# Patient Record
Sex: FEMALE | Race: WHITE | NOT HISPANIC OR LATINO | Employment: OTHER | URBAN - METROPOLITAN AREA
[De-identification: names, ages, dates, MRNs, and addresses within clinical notes are randomized per-mention and may not be internally consistent; named-entity substitution may affect disease eponyms.]

---

## 2023-03-06 LAB
ALANINE AMINOTRANSFERASE (SGPT) (U/L) IN SER/PLAS: 11 U/L (ref 7–45)
ALBUMIN (G/DL) IN SER/PLAS: 4.3 G/DL (ref 3.4–5)
ALKALINE PHOSPHATASE (U/L) IN SER/PLAS: 37 U/L (ref 33–136)
ANION GAP IN SER/PLAS: 12 MMOL/L (ref 10–20)
ASPARTATE AMINOTRANSFERASE (SGOT) (U/L) IN SER/PLAS: 21 U/L (ref 9–39)
BILIRUBIN TOTAL (MG/DL) IN SER/PLAS: 0.5 MG/DL (ref 0–1.2)
CALCIUM (MG/DL) IN SER/PLAS: 9.5 MG/DL (ref 8.6–10.6)
CARBON DIOXIDE, TOTAL (MMOL/L) IN SER/PLAS: 31 MMOL/L (ref 21–32)
CHLORIDE (MMOL/L) IN SER/PLAS: 102 MMOL/L (ref 98–107)
CREATININE (MG/DL) IN SER/PLAS: 1.12 MG/DL (ref 0.5–1.05)
ERYTHROCYTE DISTRIBUTION WIDTH (RATIO) BY AUTOMATED COUNT: 14.2 % (ref 11.5–14.5)
ERYTHROCYTE MEAN CORPUSCULAR HEMOGLOBIN CONCENTRATION (G/DL) BY AUTOMATED: 31.5 G/DL (ref 32–36)
ERYTHROCYTE MEAN CORPUSCULAR VOLUME (FL) BY AUTOMATED COUNT: 96 FL (ref 80–100)
ERYTHROCYTES (10*6/UL) IN BLOOD BY AUTOMATED COUNT: 3.69 X10E12/L (ref 4–5.2)
GFR FEMALE: 50 ML/MIN/1.73M2
GLUCOSE (MG/DL) IN SER/PLAS: 94 MG/DL (ref 74–99)
HEMATOCRIT (%) IN BLOOD BY AUTOMATED COUNT: 35.5 % (ref 36–46)
HEMOGLOBIN (G/DL) IN BLOOD: 11.2 G/DL (ref 12–16)
INR IN PPP BY COAGULATION ASSAY: 1.1 (ref 0.9–1.1)
LEUKOCYTES (10*3/UL) IN BLOOD BY AUTOMATED COUNT: 6.5 X10E9/L (ref 4.4–11.3)
NRBC (PER 100 WBCS) BY AUTOMATED COUNT: 0 /100 WBC (ref 0–0)
PLATELETS (10*3/UL) IN BLOOD AUTOMATED COUNT: 209 X10E9/L (ref 150–450)
POTASSIUM (MMOL/L) IN SER/PLAS: 4 MMOL/L (ref 3.5–5.3)
PROTEIN TOTAL: 6.9 G/DL (ref 6.4–8.2)
PROTHROMBIN TIME (PT) IN PPP BY COAGULATION ASSAY: 13.3 SEC (ref 9.8–13.4)
SODIUM (MMOL/L) IN SER/PLAS: 141 MMOL/L (ref 136–145)
UREA NITROGEN (MG/DL) IN SER/PLAS: 23 MG/DL (ref 6–23)

## 2023-07-06 LAB
ALANINE AMINOTRANSFERASE (SGPT) (U/L) IN SER/PLAS: 10 U/L (ref 7–45)
ALBUMIN (G/DL) IN SER/PLAS: 4.2 G/DL (ref 3.4–5)
ALKALINE PHOSPHATASE (U/L) IN SER/PLAS: 35 U/L (ref 33–136)
ANION GAP IN SER/PLAS: 12 MMOL/L (ref 10–20)
ASPARTATE AMINOTRANSFERASE (SGOT) (U/L) IN SER/PLAS: 18 U/L (ref 9–39)
BILIRUBIN TOTAL (MG/DL) IN SER/PLAS: 0.5 MG/DL (ref 0–1.2)
CALCIDIOL (25 OH VITAMIN D3) (NG/ML) IN SER/PLAS: 56 NG/ML
CALCIUM (MG/DL) IN SER/PLAS: 9.5 MG/DL (ref 8.6–10.6)
CARBON DIOXIDE, TOTAL (MMOL/L) IN SER/PLAS: 30 MMOL/L (ref 21–32)
CHLORIDE (MMOL/L) IN SER/PLAS: 106 MMOL/L (ref 98–107)
CHOLESTEROL (MG/DL) IN SER/PLAS: 145 MG/DL (ref 0–199)
CHOLESTEROL IN HDL (MG/DL) IN SER/PLAS: 49.3 MG/DL
CHOLESTEROL/HDL RATIO: 2.9
COBALAMIN (VITAMIN B12) (PG/ML) IN SER/PLAS: 1325 PG/ML (ref 211–911)
CREATININE (MG/DL) IN SER/PLAS: 1.26 MG/DL (ref 0.5–1.05)
ERYTHROCYTE DISTRIBUTION WIDTH (RATIO) BY AUTOMATED COUNT: 13 % (ref 11.5–14.5)
ERYTHROCYTE MEAN CORPUSCULAR HEMOGLOBIN CONCENTRATION (G/DL) BY AUTOMATED: 32.2 G/DL (ref 32–36)
ERYTHROCYTE MEAN CORPUSCULAR VOLUME (FL) BY AUTOMATED COUNT: 101 FL (ref 80–100)
ERYTHROCYTES (10*6/UL) IN BLOOD BY AUTOMATED COUNT: 3.4 X10E12/L (ref 4–5.2)
FERRITIN (UG/LL) IN SER/PLAS: 55 UG/L (ref 8–150)
GFR FEMALE: 43 ML/MIN/1.73M2
GLUCOSE (MG/DL) IN SER/PLAS: 87 MG/DL (ref 74–99)
HEMATOCRIT (%) IN BLOOD BY AUTOMATED COUNT: 34.2 % (ref 36–46)
HEMOGLOBIN (G/DL) IN BLOOD: 11 G/DL (ref 12–16)
IRON (UG/DL) IN SER/PLAS: 89 UG/DL (ref 35–150)
IRON BINDING CAPACITY (UG/DL) IN SER/PLAS: 329 UG/DL (ref 240–445)
IRON SATURATION (%) IN SER/PLAS: 27 % (ref 25–45)
LDL: 77 MG/DL (ref 0–99)
LEUKOCYTES (10*3/UL) IN BLOOD BY AUTOMATED COUNT: 5.7 X10E9/L (ref 4.4–11.3)
NRBC (PER 100 WBCS) BY AUTOMATED COUNT: 0 /100 WBC (ref 0–0)
PLATELETS (10*3/UL) IN BLOOD AUTOMATED COUNT: 192 X10E9/L (ref 150–450)
POTASSIUM (MMOL/L) IN SER/PLAS: 4.2 MMOL/L (ref 3.5–5.3)
PROTEIN TOTAL: 6.9 G/DL (ref 6.4–8.2)
SODIUM (MMOL/L) IN SER/PLAS: 144 MMOL/L (ref 136–145)
THYROTROPIN (MIU/L) IN SER/PLAS BY DETECTION LIMIT <= 0.05 MIU/L: 3.72 MIU/L (ref 0.44–3.98)
TRIGLYCERIDE (MG/DL) IN SER/PLAS: 92 MG/DL (ref 0–149)
UREA NITROGEN (MG/DL) IN SER/PLAS: 14 MG/DL (ref 6–23)
VLDL: 18 MG/DL (ref 0–40)

## 2023-10-23 ENCOUNTER — LAB (OUTPATIENT)
Dept: LAB | Facility: LAB | Age: 83
End: 2023-10-23
Payer: MEDICARE

## 2023-10-23 DIAGNOSIS — I10 ESSENTIAL (PRIMARY) HYPERTENSION: Primary | ICD-10-CM

## 2023-10-23 PROCEDURE — 80048 BASIC METABOLIC PNL TOTAL CA: CPT

## 2023-10-23 PROCEDURE — 36415 COLL VENOUS BLD VENIPUNCTURE: CPT

## 2023-10-24 DIAGNOSIS — E87.6 HYPOKALEMIA: Primary | ICD-10-CM

## 2023-10-24 LAB
ANION GAP SERPL CALC-SCNC: 14 MMOL/L (ref 10–20)
BUN SERPL-MCNC: 26 MG/DL (ref 6–23)
CALCIUM SERPL-MCNC: 9.7 MG/DL (ref 8.6–10.6)
CHLORIDE SERPL-SCNC: 93 MMOL/L (ref 98–107)
CO2 SERPL-SCNC: 35 MMOL/L (ref 21–32)
CREAT SERPL-MCNC: 1.46 MG/DL (ref 0.5–1.05)
GFR SERPL CREATININE-BSD FRML MDRD: 36 ML/MIN/1.73M*2
GLUCOSE SERPL-MCNC: 59 MG/DL (ref 74–99)
POTASSIUM SERPL-SCNC: 3.2 MMOL/L (ref 3.5–5.3)
SODIUM SERPL-SCNC: 139 MMOL/L (ref 136–145)

## 2023-10-24 RX ORDER — POTASSIUM CHLORIDE 20 MEQ/1
TABLET, EXTENDED RELEASE ORAL
Qty: 3 TABLET | Refills: 0 | Status: SHIPPED | OUTPATIENT
Start: 2023-10-24 | End: 2023-11-03 | Stop reason: ALTCHOICE

## 2023-10-27 ENCOUNTER — TELEPHONE (OUTPATIENT)
Dept: PRIMARY CARE | Facility: CLINIC | Age: 83
End: 2023-10-27
Payer: MEDICARE

## 2023-10-27 NOTE — TELEPHONE ENCOUNTER
----- Message from Flory Gage MD sent at 10/24/2023  8:30 AM EDT -----  Potassium is low and some decrease in renal function from the hydrochlorothiazide. Recommend cutting hydrochlorothiazide in half and only taking one half a day. Will send in rx for potassium to take for a few days. Will review at upcoming appointment.

## 2023-10-27 NOTE — TELEPHONE ENCOUNTER
Called pt and spoke with pt and spouse, informed of results and medication direction, voiced understanding.

## 2023-11-02 PROBLEM — M25.472 ANKLE EDEMA, BILATERAL: Status: ACTIVE | Noted: 2022-12-02

## 2023-11-02 PROBLEM — F51.5 NIGHTMARES: Status: ACTIVE | Noted: 2023-11-02

## 2023-11-02 PROBLEM — N32.81 OVERACTIVE BLADDER: Status: ACTIVE | Noted: 2022-09-02

## 2023-11-02 PROBLEM — R53.83 FATIGUE: Status: ACTIVE | Noted: 2023-11-02

## 2023-11-02 PROBLEM — E53.8 VITAMIN B12 DEFICIENCY: Status: ACTIVE | Noted: 2023-11-02

## 2023-11-02 PROBLEM — E87.6 HYPOKALEMIA: Status: ACTIVE | Noted: 2023-11-02

## 2023-11-02 PROBLEM — R79.1 ABNORMAL INR: Status: ACTIVE | Noted: 2023-11-02

## 2023-11-02 PROBLEM — G45.8 OTHER TRANSIENT CEREBRAL ISCHEMIC ATTACKS AND RELATED SYNDROMES: Status: ACTIVE | Noted: 2023-11-02

## 2023-11-02 PROBLEM — F03.90 DEMENTIA (MULTI): Status: ACTIVE | Noted: 2023-11-02

## 2023-11-02 PROBLEM — E55.9 VITAMIN D DEFICIENCY: Status: ACTIVE | Noted: 2023-11-02

## 2023-11-02 PROBLEM — R31.9 HEMATURIA: Status: ACTIVE | Noted: 2023-11-02

## 2023-11-02 PROBLEM — M19.90 ARTHRITIS: Status: ACTIVE | Noted: 2019-04-18

## 2023-11-02 PROBLEM — R93.89 ABNORMAL ULTRASOUND: Status: ACTIVE | Noted: 2023-11-02

## 2023-11-02 PROBLEM — E87.1 HYPONATREMIA: Status: ACTIVE | Noted: 2023-11-02

## 2023-11-02 PROBLEM — I10 ESSENTIAL HYPERTENSION: Status: ACTIVE | Noted: 2019-04-18

## 2023-11-02 PROBLEM — K63.5 BENIGN COLONIC POLYP: Status: ACTIVE | Noted: 2023-11-02

## 2023-11-02 PROBLEM — E78.5 HYPERLIPIDEMIA: Status: ACTIVE | Noted: 2023-11-02

## 2023-11-02 PROBLEM — R93.5 ABNORMAL CT OF THE ABDOMEN: Status: ACTIVE | Noted: 2023-11-02

## 2023-11-02 PROBLEM — H61.21 IMPACTED CERUMEN OF RIGHT EAR: Status: ACTIVE | Noted: 2023-11-02

## 2023-11-02 PROBLEM — H91.90 HEARING LOSS: Status: ACTIVE | Noted: 2023-11-02

## 2023-11-02 PROBLEM — R49.0 HOARSENESS: Status: ACTIVE | Noted: 2023-11-02

## 2023-11-02 PROBLEM — R49.0 RASPY VOICE: Status: ACTIVE | Noted: 2023-11-02

## 2023-11-02 PROBLEM — R23.3 ABNORMAL BRUISING: Status: ACTIVE | Noted: 2023-11-02

## 2023-11-02 PROBLEM — E03.9 HYPOTHYROIDISM: Status: ACTIVE | Noted: 2023-11-02

## 2023-11-02 PROBLEM — R55 SYNCOPE: Status: ACTIVE | Noted: 2023-11-02

## 2023-11-02 PROBLEM — R41.89 IMPAIRED COGNITION: Status: ACTIVE | Noted: 2022-09-03

## 2023-11-02 PROBLEM — R35.1 NOCTURIA: Status: ACTIVE | Noted: 2023-11-02

## 2023-11-02 PROBLEM — R93.89 ABNORMAL MRI: Status: ACTIVE | Noted: 2023-11-02

## 2023-11-02 PROBLEM — S09.90XA CHI (CLOSED HEAD INJURY): Status: ACTIVE | Noted: 2022-12-03

## 2023-11-02 PROBLEM — G47.50 PARASOMNIA: Status: ACTIVE | Noted: 2023-11-02

## 2023-11-02 PROBLEM — F51.3 SLEEP WALKING DISORDER: Status: ACTIVE | Noted: 2023-11-02

## 2023-11-02 PROBLEM — G25.81 RESTLESS LEG SYNDROME: Status: ACTIVE | Noted: 2023-11-02

## 2023-11-02 PROBLEM — I65.29 CAROTID STENOSIS, ASYMPTOMATIC: Status: ACTIVE | Noted: 2023-11-02

## 2023-11-02 PROBLEM — G47.9 DISTURBANCE IN SLEEP BEHAVIOR: Status: ACTIVE | Noted: 2023-11-02

## 2023-11-02 PROBLEM — R60.9 EDEMA: Status: ACTIVE | Noted: 2023-11-02

## 2023-11-02 PROBLEM — R06.83 SNORING: Status: ACTIVE | Noted: 2023-11-02

## 2023-11-02 PROBLEM — E07.9 THYROID CONDITION: Status: ACTIVE | Noted: 2019-04-18

## 2023-11-02 PROBLEM — R35.0 URINARY FREQUENCY: Status: ACTIVE | Noted: 2023-11-02

## 2023-11-02 PROBLEM — R41.3 MEMORY LOSS: Status: ACTIVE | Noted: 2023-11-02

## 2023-11-02 PROBLEM — G47.33 OSA (OBSTRUCTIVE SLEEP APNEA): Status: ACTIVE | Noted: 2022-04-08

## 2023-11-02 PROBLEM — M25.471 ANKLE EDEMA, BILATERAL: Status: ACTIVE | Noted: 2022-12-02

## 2023-11-02 PROBLEM — D72.819 LEUKOPENIA: Status: ACTIVE | Noted: 2023-11-02

## 2023-11-02 PROBLEM — N18.9 CKD (CHRONIC KIDNEY DISEASE): Status: ACTIVE | Noted: 2022-04-08

## 2023-11-02 PROBLEM — D64.9 ANEMIA: Status: ACTIVE | Noted: 2023-11-02

## 2023-11-02 PROBLEM — M81.0 OSTEOPOROSIS: Status: ACTIVE | Noted: 2023-11-02

## 2023-11-02 RX ORDER — CLONAZEPAM 1 MG/1
0.5 TABLET ORAL NIGHTLY
COMMUNITY
Start: 2021-07-17 | End: 2023-11-03 | Stop reason: ALTCHOICE

## 2023-11-02 RX ORDER — HYDROCHLOROTHIAZIDE 25 MG/1
TABLET ORAL
COMMUNITY
Start: 2023-10-23 | End: 2023-11-03 | Stop reason: WASHOUT

## 2023-11-02 RX ORDER — LISINOPRIL AND HYDROCHLOROTHIAZIDE 12.5; 2 MG/1; MG/1
1 TABLET ORAL 2 TIMES DAILY
COMMUNITY
Start: 2019-02-28 | End: 2023-11-03 | Stop reason: WASHOUT

## 2023-11-02 RX ORDER — CALCITRIOL 0.25 UG/1
0.25 CAPSULE ORAL
COMMUNITY
Start: 2023-07-26 | End: 2024-01-22

## 2023-11-02 RX ORDER — AMLODIPINE BESYLATE 5 MG/1
5 TABLET ORAL DAILY
COMMUNITY
Start: 2022-07-19 | End: 2023-11-03 | Stop reason: SDUPTHER

## 2023-11-02 RX ORDER — LISINOPRIL 30 MG/1
1 TABLET ORAL DAILY
COMMUNITY
Start: 2022-12-09 | End: 2023-11-03 | Stop reason: WASHOUT

## 2023-11-02 RX ORDER — ESZOPICLONE 1 MG/1
1 TABLET, FILM COATED ORAL NIGHTLY PRN
COMMUNITY
End: 2024-05-10

## 2023-11-02 RX ORDER — AMMONIUM LACTATE 12 G/100G
LOTION TOPICAL
COMMUNITY
Start: 2022-08-31

## 2023-11-02 RX ORDER — TRIAMCINOLONE ACETONIDE 1 MG/G
CREAM TOPICAL AS NEEDED
COMMUNITY
Start: 2019-04-08

## 2023-11-02 RX ORDER — ATORVASTATIN CALCIUM 20 MG/1
20 TABLET, FILM COATED ORAL DAILY
COMMUNITY
Start: 2019-02-28 | End: 2024-04-12 | Stop reason: SDUPTHER

## 2023-11-02 RX ORDER — FLUOCINONIDE TOPICAL SOLUTION USP, 0.05% 0.5 MG/ML
SOLUTION TOPICAL
COMMUNITY
Start: 2021-11-29

## 2023-11-02 RX ORDER — DONEPEZIL HYDROCHLORIDE 10 MG/1
1 TABLET, FILM COATED ORAL DAILY
COMMUNITY
End: 2023-11-03

## 2023-11-02 RX ORDER — MAGNESIUM 200 MG
1 TABLET ORAL DAILY
COMMUNITY
Start: 2020-08-24

## 2023-11-02 RX ORDER — LISINOPRIL 40 MG/1
40 TABLET ORAL DAILY
COMMUNITY
Start: 2023-09-27 | End: 2023-12-28

## 2023-11-02 RX ORDER — LEVOTHYROXINE SODIUM 25 UG/1
25 TABLET ORAL DAILY
COMMUNITY
Start: 2020-09-11 | End: 2024-02-15 | Stop reason: SDUPTHER

## 2023-11-02 RX ORDER — VIT C/E/ZN/COPPR/LUTEIN/ZEAXAN 250MG-90MG
1000 CAPSULE ORAL DAILY
COMMUNITY
Start: 2023-07-25

## 2023-11-02 RX ORDER — ASPIRIN 81 MG/1
1 TABLET ORAL DAILY
COMMUNITY
Start: 2019-02-28 | End: 2024-01-05 | Stop reason: WASHOUT

## 2023-11-02 RX ORDER — MIRABEGRON 25 MG/1
1 TABLET, FILM COATED, EXTENDED RELEASE ORAL DAILY
COMMUNITY
Start: 2022-12-09 | End: 2024-01-05 | Stop reason: WASHOUT

## 2023-11-02 RX ORDER — AMLODIPINE BESYLATE 2.5 MG/1
2.5 TABLET ORAL DAILY
COMMUNITY
Start: 2022-10-29 | End: 2023-11-03 | Stop reason: WASHOUT

## 2023-11-02 RX ORDER — LATANOPROST 50 UG/ML
1 SOLUTION/ DROPS OPHTHALMIC NIGHTLY
COMMUNITY
Start: 2012-10-10

## 2023-11-02 RX ORDER — DORZOLAMIDE HCL 20 MG/ML
1 SOLUTION/ DROPS OPHTHALMIC 2 TIMES DAILY
COMMUNITY
Start: 2019-04-10

## 2023-11-02 RX ORDER — LISINOPRIL 20 MG/1
20 TABLET ORAL DAILY
COMMUNITY
Start: 2019-10-03 | End: 2023-11-03 | Stop reason: WASHOUT

## 2023-11-02 RX ORDER — ALENDRONATE SODIUM 70 MG/1
TABLET ORAL
COMMUNITY
Start: 2021-12-17 | End: 2024-01-05 | Stop reason: SDUPTHER

## 2023-11-02 RX ORDER — ATENOLOL 25 MG/1
25 TABLET ORAL 2 TIMES DAILY
COMMUNITY
Start: 2012-10-10 | End: 2024-04-17 | Stop reason: SDUPTHER

## 2023-11-03 ENCOUNTER — LAB (OUTPATIENT)
Dept: LAB | Facility: LAB | Age: 83
End: 2023-11-03
Payer: MEDICARE

## 2023-11-03 ENCOUNTER — OFFICE VISIT (OUTPATIENT)
Dept: PRIMARY CARE | Facility: CLINIC | Age: 83
End: 2023-11-03
Payer: MEDICARE

## 2023-11-03 VITALS
BODY MASS INDEX: 18.31 KG/M2 | DIASTOLIC BLOOD PRESSURE: 52 MMHG | TEMPERATURE: 98.2 F | WEIGHT: 97 LBS | HEART RATE: 54 BPM | SYSTOLIC BLOOD PRESSURE: 110 MMHG | HEIGHT: 61 IN | OXYGEN SATURATION: 99 %

## 2023-11-03 DIAGNOSIS — I10 ESSENTIAL HYPERTENSION: ICD-10-CM

## 2023-11-03 DIAGNOSIS — I10 ESSENTIAL HYPERTENSION: Primary | ICD-10-CM

## 2023-11-03 LAB
ALBUMIN SERPL BCP-MCNC: 4.3 G/DL (ref 3.4–5)
ALP SERPL-CCNC: 31 U/L (ref 33–136)
ALT SERPL W P-5'-P-CCNC: 15 U/L (ref 7–45)
ANION GAP SERPL CALC-SCNC: 12 MMOL/L (ref 10–20)
AST SERPL W P-5'-P-CCNC: 23 U/L (ref 9–39)
BILIRUB SERPL-MCNC: 0.5 MG/DL (ref 0–1.2)
BUN SERPL-MCNC: 28 MG/DL (ref 6–23)
CALCIUM SERPL-MCNC: 9.7 MG/DL (ref 8.6–10.3)
CHLORIDE SERPL-SCNC: 99 MMOL/L (ref 98–107)
CO2 SERPL-SCNC: 30 MMOL/L (ref 21–32)
CREAT SERPL-MCNC: 1.71 MG/DL (ref 0.5–1.05)
GFR SERPL CREATININE-BSD FRML MDRD: 30 ML/MIN/1.73M*2
GLUCOSE SERPL-MCNC: 82 MG/DL (ref 74–99)
POTASSIUM SERPL-SCNC: 3.7 MMOL/L (ref 3.5–5.3)
PROT SERPL-MCNC: 7.3 G/DL (ref 6.4–8.2)
SODIUM SERPL-SCNC: 137 MMOL/L (ref 136–145)

## 2023-11-03 PROCEDURE — 3078F DIAST BP <80 MM HG: CPT | Performed by: INTERNAL MEDICINE

## 2023-11-03 PROCEDURE — 99213 OFFICE O/P EST LOW 20 MIN: CPT | Performed by: INTERNAL MEDICINE

## 2023-11-03 PROCEDURE — 80053 COMPREHEN METABOLIC PANEL: CPT

## 2023-11-03 PROCEDURE — 3074F SYST BP LT 130 MM HG: CPT | Performed by: INTERNAL MEDICINE

## 2023-11-03 PROCEDURE — 1036F TOBACCO NON-USER: CPT | Performed by: INTERNAL MEDICINE

## 2023-11-03 PROCEDURE — 1159F MED LIST DOCD IN RCRD: CPT | Performed by: INTERNAL MEDICINE

## 2023-11-03 PROCEDURE — 1126F AMNT PAIN NOTED NONE PRSNT: CPT | Performed by: INTERNAL MEDICINE

## 2023-11-03 PROCEDURE — 36415 COLL VENOUS BLD VENIPUNCTURE: CPT

## 2023-11-03 RX ORDER — AMLODIPINE BESYLATE 5 MG/1
TABLET ORAL
Qty: 135 TABLET | Refills: 1 | Status: SHIPPED | OUTPATIENT
Start: 2023-11-03 | End: 2023-11-03 | Stop reason: DRUGHIGH

## 2023-11-03 RX ORDER — HYDROCHLOROTHIAZIDE 12.5 MG/1
12.5 TABLET ORAL DAILY
Qty: 90 TABLET | Refills: 1 | Status: SHIPPED | OUTPATIENT
Start: 2023-11-03 | End: 2024-01-05 | Stop reason: WASHOUT

## 2023-11-03 ASSESSMENT — PAIN SCALES - GENERAL: PAINLEVEL: 0-NO PAIN

## 2023-11-03 ASSESSMENT — PATIENT HEALTH QUESTIONNAIRE - PHQ9
SUM OF ALL RESPONSES TO PHQ9 QUESTIONS 1 & 2: 0
2. FEELING DOWN, DEPRESSED OR HOPELESS: NOT AT ALL
1. LITTLE INTEREST OR PLEASURE IN DOING THINGS: NOT AT ALL

## 2023-11-03 NOTE — PROGRESS NOTES
"Chief Complaint   Patient presents with    Follow-up     Pt here for 3 week FUV.     F/up htn.  Accompanied by her daughter.  Brought in list of blood pressure readings  Last visit 9/2023  Bp readings 108-172/56-71  Heart rate 60s  Denies any chest pain pressure trouble breathing or other symptoms.  No lower extremity edema.  After last visit lab work showed potassium down to 3.2 and creatinine up to 1.46 so hydrochlorothiazide lowered from 25 mg to 12.5 mg.    Past Medical History  HTN  hyperlipidemia  Hypothyroidism tsh 7/2023 normal  colon polyp scope 2020  osteopenia dexa 11/2021 T -3.1  myalgia secondary to statin  cataract ou sx  B12 deficiency  nightmares Dr Quintanilla, Dr Gore [facility]- ordered sleep study  VANGIE-could not tolerate treatment  CKD Dr Tillman.   abnormal CT abd per GI  pelvis US 12/2020 somewhat complex lower uterine segment appearing cystic abnormality. evaluated by urologist.   cystoscopy was negative. Dr Jewell  Dementia  fall, change in mental status, admission 12/2022 (orthostatic, abnormal ua) Dr Mendoza     , Nonsmoker    Blood pressure 110/52, pulse 54, temperature 36.8 °C (98.2 °F), height 1.549 m (5' 1\"), weight (!) 44 kg (97 lb), SpO2 99 %.  Body mass index is 18.33 kg/m².    Repeat bp right arm patient sitting regular cuff 134/50  NAD  Extremities: no pretibial edema   Latest Reference Range & Units 10/23/23   SODIUM 136 - 145 mmol/L 139   POTASSIUM 3.5 - 5.3 mmol/L 3.2 (L)   CHLORIDE 98 - 107 mmol/L 93 (L)   Bicarbonate 21 - 32 mmol/L 35 (H)   Blood Urea Nitrogen 6 - 23 mg/dL 26 (H)   Creatinine 0.50 - 1.05 mg/dL 1.46 (H)   GLUCOSE 74 - 99 mg/dL 59 (L)   Calcium 8.6 - 10.6 mg/dL 9.7           Asymptomatic.  Obtain left thigh lab work this week or next week.  Continue atenolol, lisinopril, hydrochlorothiazide at low-dose.  Increase amlodipine from 5 mg to 7.5 mg daily.  Declines mammogram  DEXA November 2021  Colonoscopy August 2020    "

## 2023-11-05 DIAGNOSIS — N18.9 CHRONIC KIDNEY DISEASE, UNSPECIFIED CKD STAGE: Primary | ICD-10-CM

## 2023-11-06 ENCOUNTER — TELEPHONE (OUTPATIENT)
Dept: PRIMARY CARE | Facility: CLINIC | Age: 83
End: 2023-11-06
Payer: MEDICARE

## 2023-11-07 NOTE — TELEPHONE ENCOUNTER
----- Message from Flory Gage MD sent at 11/5/2023  5:21 PM EST -----  Potassium is normal but kidney function has declined. Recommend stop hydrochlorothiazide completely and recheck labs in 2-3 weeks. (If uh labs no paperwork needed otherwise send order to patient)   
Called pt and left message to return call to clinical.   
Called pt and left message to return call to clinical.   
Pt and  returned call and informed, voiced understanding.  Pt will be going to  lab.  
General

## 2023-11-13 DIAGNOSIS — I10 HYPERTENSION, UNSPECIFIED TYPE: ICD-10-CM

## 2023-11-14 RX ORDER — AMLODIPINE BESYLATE 2.5 MG/1
2.5 TABLET ORAL DAILY
COMMUNITY
End: 2023-11-28 | Stop reason: SDUPTHER

## 2023-11-14 RX ORDER — AMLODIPINE BESYLATE 2.5 MG/1
2.5 TABLET ORAL DAILY
Qty: 90 TABLET | Refills: 1 | Status: CANCELLED | OUTPATIENT
Start: 2023-11-14

## 2023-11-15 NOTE — TELEPHONE ENCOUNTER
Daughter, Mila, returned call and confirmed that pt is taking Amlodipine 5 MG one and one-half tablets daily.  Daughter states they did not request refill from the pharmacy and do not need refills at this time.

## 2023-11-28 DIAGNOSIS — I10 ESSENTIAL HYPERTENSION: Primary | ICD-10-CM

## 2023-11-28 DIAGNOSIS — I10 HYPERTENSION, ESSENTIAL: ICD-10-CM

## 2023-11-28 RX ORDER — AMLODIPINE BESYLATE 5 MG/1
TABLET ORAL
Qty: 90 TABLET | Refills: 3 | Status: SHIPPED | OUTPATIENT
Start: 2023-11-28 | End: 2024-01-05 | Stop reason: SDUPTHER

## 2023-11-28 RX ORDER — AMLODIPINE BESYLATE 2.5 MG/1
2.5 TABLET ORAL DAILY
Qty: 90 TABLET | Refills: 3 | Status: SHIPPED | OUTPATIENT
Start: 2023-11-28 | End: 2024-04-12 | Stop reason: DRUGHIGH

## 2023-11-30 ENCOUNTER — TELEPHONE (OUTPATIENT)
Dept: PRIMARY CARE | Facility: CLINIC | Age: 83
End: 2023-11-30
Payer: MEDICARE

## 2023-11-30 LAB
NON-UH HIE BUN/CREAT RATIO: 11.3
NON-UH HIE BUN: 17 MG/DL (ref 9–23)
NON-UH HIE CALCIUM: 8.6 MG/DL (ref 8.7–10.4)
NON-UH HIE CALCULATED OSMOLALITY: 277 MOSM/KG (ref 275–295)
NON-UH HIE CHLORIDE: 103 MMOL/L (ref 98–107)
NON-UH HIE CO2, VENOUS: 27 MMOL/L (ref 20–31)
NON-UH HIE CREATININE: 1.5 MG/DL (ref 0.5–0.8)
NON-UH HIE GFR AA: 40
NON-UH HIE GLOMERULAR FILTRATION RATE: 33 ML/MIN/1.73M?
NON-UH HIE GLUCOSE: 86 MG/DL (ref 74–106)
NON-UH HIE K: 3.6 MMOL/L (ref 3.5–5.1)
NON-UH HIE NA: 138 MMOL/L (ref 135–145)

## 2023-11-30 NOTE — TELEPHONE ENCOUNTER
----- Message from Flory Gage MD sent at 11/30/2023  4:38 PM EST -----  Kidney function is abnormal but improved from last test. Recommend continue to hold hydrochlorothiazide.

## 2023-11-30 NOTE — TELEPHONE ENCOUNTER
Called pt and informed pt and .   confirmed that pt is not currently taking the hydrochlorothiazide and will continue to hold.

## 2023-12-28 DIAGNOSIS — I10 HYPERTENSION, UNSPECIFIED TYPE: ICD-10-CM

## 2023-12-28 RX ORDER — LISINOPRIL 40 MG/1
40 TABLET ORAL DAILY
Qty: 90 TABLET | Refills: 0 | Status: SHIPPED | OUTPATIENT
Start: 2023-12-28 | End: 2024-01-22 | Stop reason: SDUPTHER

## 2024-01-04 PROBLEM — R31.9 HEMATURIA: Status: RESOLVED | Noted: 2023-11-02 | Resolved: 2024-01-04

## 2024-01-04 PROBLEM — R49.0 HOARSENESS: Status: RESOLVED | Noted: 2023-11-02 | Resolved: 2024-01-04

## 2024-01-04 PROBLEM — R06.83 SNORING: Status: RESOLVED | Noted: 2023-11-02 | Resolved: 2024-01-04

## 2024-01-04 PROBLEM — R79.1 ABNORMAL INR: Status: RESOLVED | Noted: 2023-11-02 | Resolved: 2024-01-04

## 2024-01-04 PROBLEM — R23.3 ABNORMAL BRUISING: Status: RESOLVED | Noted: 2023-11-02 | Resolved: 2024-01-04

## 2024-01-04 PROBLEM — R41.89 IMPAIRED COGNITION: Status: RESOLVED | Noted: 2022-09-03 | Resolved: 2024-01-04

## 2024-01-04 PROBLEM — H61.21 IMPACTED CERUMEN OF RIGHT EAR: Status: RESOLVED | Noted: 2023-11-02 | Resolved: 2024-01-04

## 2024-01-04 PROBLEM — E07.9 THYROID CONDITION: Status: RESOLVED | Noted: 2019-04-18 | Resolved: 2024-01-04

## 2024-01-04 PROBLEM — R41.3 MEMORY LOSS: Status: RESOLVED | Noted: 2023-11-02 | Resolved: 2024-01-04

## 2024-01-04 PROBLEM — G47.9 DISTURBANCE IN SLEEP BEHAVIOR: Status: RESOLVED | Noted: 2023-11-02 | Resolved: 2024-01-04

## 2024-01-04 PROBLEM — R49.0 RASPY VOICE: Status: RESOLVED | Noted: 2023-11-02 | Resolved: 2024-01-04

## 2024-01-04 NOTE — PROGRESS NOTES
"Standard Progress Note  Chief Complaint   Patient presents with    Follow-up     Pt here for 2 mo FUV.  Pt needs refills.   Daughter present. Needs refills amlodipine 5 mg dose and alendronate.     HPI:  Jennifer June 83 y.o.   female  Chief Complaint   Patient presents with    Follow-up     Pt here for 2 mo FUV.  Pt needs refills.     F/up htn.  Accompanied by her daughter. Last visit 11 /2023  Patient denies chest pain, pressure, palpitations, or shortness of breath.  Patient notes no abdominal pain, nausea, reflux symptoms, and vomiting  Still does the cooking. No problems cooking.     Past Medical History  HTN  hyperlipidemia  Hypothyroidism tsh 7/2023 normal  colon polyp scope 2020  osteopenia dexa 11/2021 T -3.1  myalgia secondary to statin  cataract ou sx  B12 deficiency  nightmares Dr Quintanilla, Dr Gore [facility]- ordered sleep study  VANGIE-could not tolerate treatment  CKD Dr Tillman.   abnormal CT abd per GI  pelvis US 12/2020 somewhat complex lower uterine segment appearing cystic abnormality. evaluated by urologist.   cystoscopy was negative. Dr Jewell  Dementia  fall, change in mental status, admission 12/2022 (orthostatic, abnormal ua) Dr Mendoza     , Nonsmoker    Blood pressure 130/62, pulse 68, temperature 36.4 °C (97.6 °F), height 1.549 m (5' 1\"), weight (!) 44.6 kg (98 lb 6.4 oz), SpO2 98 %.        Vital reviewed  Neck: no cervical/clavicular adenopathy  CV: RRR S1 S2 normal. No murmur. No carotid bruit.   Lungs: CTA without wrr. Breath sounds symmetric  Abdomen: normoactive. Soft, nontender. No mass.  Extremities: no pretibial edema  Neuro: speech intact.   Able to go up and down exam table independently      Lab Results   Component Value Date    GLUCOSE 82 11/03/2023    CALCIUM 9.7 11/03/2023     11/03/2023    K 3.7 11/03/2023    CO2 30 11/03/2023    CL 99 11/03/2023    BUN 28 (H) 11/03/2023    CREATININE 1.71 (H) 11/03/2023      Lab Results   Component Value Date    WBC 5.7 07/06/2023    " "HGB 11.0 (L) 07/06/2023    HCT 34.2 (L) 07/06/2023     (H) 07/06/2023     07/06/2023      Lab Results   Component Value Date    CHOL 145 07/06/2023    CHOL 148 06/09/2022    CHOL 142 06/05/2019     Lab Results   Component Value Date    HDL 49.3 07/06/2023    HDL 54.8 06/09/2022    HDL 48.0 06/05/2019     No results found for: \"LDLCALC\"  Lab Results   Component Value Date    TRIG 92 07/06/2023    TRIG 78 06/09/2022    TRIG 77 06/05/2019     No components found for: \"CHOLHDL\"    Assessment/Plan   Jennifer was seen today for follow-up.  Diagnoses and all orders for this visit:  Osteoporosis, unspecified osteoporosis type, unspecified pathological fracture presence (Primary)  -     alendronate (Fosamax) 70 mg tablet; Take 1 tablet by mouth one time a week. in the AM with glass of water on empty stomach. Do not take anything else by mouth or lie down for 30 min  Essential hypertension  -     amLODIPine (Norvasc) 5 mg tablet; One po at bedtime. Take with 2.5 mg tablet.  Asymptomatic, within goal.   Hypothyroidism  Future tsh  Hyperlipidemia future labs.   F/up April Declines mammogram  DEXA November 2021  Colonoscopy August 2020    Current Outpatient Medications on File Prior to Visit   Medication Sig Dispense Refill    alendronate (Fosamax) 70 mg tablet Take 1 tablet by mouth one time a week. in the AM with glass of water on empty stomach. Do not take anything else by mouth or lie down for 30 min      amLODIPine (Norvasc) 2.5 mg tablet Take 1 tablet (2.5 mg) by mouth once daily. TAKE WITH 5 MG TABLET FOR 7.5 MG TOTAL 90 tablet 3    amLODIPine (Norvasc) 5 mg tablet One po at bedtime. Take with 2.5 mg tablet. 90 tablet 3    ammonium lactate (Lac-Hydrin) 12 % lotion Apply topically daily. apply to affected area      atenolol (Tenormin) 25 mg tablet Take 1 tablet (25 mg) by mouth twice a day.      atorvastatin (Lipitor) 20 mg tablet Take 1 tablet (20 mg) by mouth once daily.      calcitriol (Rocaltrol) " 0.25 mcg capsule Take 1 capsule (0.25 mcg) by mouth. every Monday,Wednesday,Friday.      cholecalciferol (Vitamin D-3) 25 MCG (1000 UT) capsule Take 1 capsule (25 mcg) by mouth once daily.      cranberry 500 mg capsule Take 1 capsule by mouth once daily.      cyanocobalamin, vitamin B-12, 1,000 mcg tablet, sublingual Place 1 tablet (1,000 mcg) under the tongue once daily. As directed.      dorzolamide (Trusopt) 2 % ophthalmic solution Administer 1 drop into both eyes 2 times a day.      eszopiclone (Lunesta) 1 mg tablet Take 1 tablet (1 mg) by mouth as needed at bedtime.      fluocinonide (Lidex) 0.05 % external solution APPLY AS NEEDED FOR ECZEMA      L.acidoph,saliva/B.bif/S.therm (ACIDOPHILUS PROBIOTIC BLEND ORAL) Take 1 capsule by mouth once daily.      latanoprost (Xalatan) 0.005 % ophthalmic solution Administer 1 drop into both eyes once daily at bedtime.      levothyroxine (Synthroid, Levoxyl) 25 mcg tablet Take 1 tablet (25 mcg) by mouth once daily.      lisinopril 40 mg tablet TAKE 1 TABLET DAILY 90 tablet 0    triamcinolone (Kenalog) 0.1 % cream if needed.      Myrbetriq 25 mg tablet extended release 24 hr 24 hr tablet Take 1 tablet (25 mg) by mouth once daily.      [DISCONTINUED] aspirin 81 mg EC tablet Take 1 tablet (81 mg) by mouth once daily.      [DISCONTINUED] hydroCHLOROthiazide (HYDRODiuril) 12.5 mg tablet Take 1 tablet (12.5 mg) by mouth once daily. (Patient not taking: Reported on 1/5/2024) 90 tablet 1     No current facility-administered medications on file prior to visit.         Flory Rivas MD

## 2024-01-05 ENCOUNTER — OFFICE VISIT (OUTPATIENT)
Dept: PRIMARY CARE | Facility: CLINIC | Age: 84
End: 2024-01-05
Payer: MEDICARE

## 2024-01-05 VITALS
DIASTOLIC BLOOD PRESSURE: 62 MMHG | HEIGHT: 61 IN | OXYGEN SATURATION: 98 % | HEART RATE: 68 BPM | BODY MASS INDEX: 18.58 KG/M2 | SYSTOLIC BLOOD PRESSURE: 130 MMHG | TEMPERATURE: 97.6 F | WEIGHT: 98.4 LBS

## 2024-01-05 DIAGNOSIS — I10 ESSENTIAL HYPERTENSION: ICD-10-CM

## 2024-01-05 DIAGNOSIS — E78.5 HYPERLIPIDEMIA, UNSPECIFIED HYPERLIPIDEMIA TYPE: ICD-10-CM

## 2024-01-05 DIAGNOSIS — M81.0 OSTEOPOROSIS, UNSPECIFIED OSTEOPOROSIS TYPE, UNSPECIFIED PATHOLOGICAL FRACTURE PRESENCE: Primary | ICD-10-CM

## 2024-01-05 DIAGNOSIS — E03.9 HYPOTHYROIDISM, UNSPECIFIED TYPE: ICD-10-CM

## 2024-01-05 PROCEDURE — 3078F DIAST BP <80 MM HG: CPT | Performed by: INTERNAL MEDICINE

## 2024-01-05 PROCEDURE — 1159F MED LIST DOCD IN RCRD: CPT | Performed by: INTERNAL MEDICINE

## 2024-01-05 PROCEDURE — 99214 OFFICE O/P EST MOD 30 MIN: CPT | Performed by: INTERNAL MEDICINE

## 2024-01-05 PROCEDURE — 1036F TOBACCO NON-USER: CPT | Performed by: INTERNAL MEDICINE

## 2024-01-05 PROCEDURE — 3075F SYST BP GE 130 - 139MM HG: CPT | Performed by: INTERNAL MEDICINE

## 2024-01-05 PROCEDURE — 1126F AMNT PAIN NOTED NONE PRSNT: CPT | Performed by: INTERNAL MEDICINE

## 2024-01-05 PROCEDURE — 1160F RVW MEDS BY RX/DR IN RCRD: CPT | Performed by: INTERNAL MEDICINE

## 2024-01-05 RX ORDER — AMLODIPINE BESYLATE 5 MG/1
TABLET ORAL
Qty: 90 TABLET | Refills: 3 | Status: SHIPPED | OUTPATIENT
Start: 2024-01-05 | End: 2024-04-12 | Stop reason: DRUGHIGH

## 2024-01-05 RX ORDER — ALENDRONATE SODIUM 70 MG/1
TABLET ORAL
Qty: 12 TABLET | Refills: 3 | Status: SHIPPED | OUTPATIENT
Start: 2024-01-05 | End: 2024-05-10

## 2024-01-05 RX ORDER — POTASSIUM &MAGNESIUM ASPARTATE 250-250 MG
1 CAPSULE ORAL DAILY
COMMUNITY

## 2024-01-05 ASSESSMENT — PATIENT HEALTH QUESTIONNAIRE - PHQ9
1. LITTLE INTEREST OR PLEASURE IN DOING THINGS: NOT AT ALL
2. FEELING DOWN, DEPRESSED OR HOPELESS: NOT AT ALL
SUM OF ALL RESPONSES TO PHQ9 QUESTIONS 1 & 2: 0

## 2024-01-05 ASSESSMENT — PAIN SCALES - GENERAL: PAINLEVEL: 0-NO PAIN

## 2024-01-22 DIAGNOSIS — I10 HYPERTENSION, UNSPECIFIED TYPE: ICD-10-CM

## 2024-01-22 RX ORDER — LISINOPRIL 40 MG/1
40 TABLET ORAL DAILY
Qty: 90 TABLET | Refills: 1 | Status: SHIPPED | OUTPATIENT
Start: 2024-01-22 | End: 2024-04-12 | Stop reason: SDUPTHER

## 2024-02-15 DIAGNOSIS — E03.9 HYPOTHYROIDISM, UNSPECIFIED TYPE: ICD-10-CM

## 2024-02-15 RX ORDER — LEVOTHYROXINE SODIUM 25 UG/1
25 TABLET ORAL DAILY
Qty: 90 TABLET | Refills: 2 | Status: SHIPPED | OUTPATIENT
Start: 2024-02-15 | End: 2024-04-12 | Stop reason: DRUGHIGH

## 2024-04-01 ENCOUNTER — LAB (OUTPATIENT)
Dept: LAB | Facility: LAB | Age: 84
End: 2024-04-01
Payer: MEDICARE

## 2024-04-01 DIAGNOSIS — E78.5 HYPERLIPIDEMIA, UNSPECIFIED HYPERLIPIDEMIA TYPE: ICD-10-CM

## 2024-04-01 DIAGNOSIS — I10 ESSENTIAL HYPERTENSION: ICD-10-CM

## 2024-04-01 DIAGNOSIS — M81.0 OSTEOPOROSIS, UNSPECIFIED OSTEOPOROSIS TYPE, UNSPECIFIED PATHOLOGICAL FRACTURE PRESENCE: ICD-10-CM

## 2024-04-01 DIAGNOSIS — E03.9 HYPOTHYROIDISM, UNSPECIFIED TYPE: ICD-10-CM

## 2024-04-01 LAB
25(OH)D3 SERPL-MCNC: 44 NG/ML (ref 30–100)
ALBUMIN SERPL BCP-MCNC: 4.3 G/DL (ref 3.4–5)
ALP SERPL-CCNC: 32 U/L (ref 33–136)
ALT SERPL W P-5'-P-CCNC: 15 U/L (ref 7–45)
ANION GAP SERPL CALC-SCNC: 13 MMOL/L (ref 10–20)
AST SERPL W P-5'-P-CCNC: 24 U/L (ref 9–39)
BILIRUB SERPL-MCNC: 0.6 MG/DL (ref 0–1.2)
BUN SERPL-MCNC: 21 MG/DL (ref 6–23)
CALCIUM SERPL-MCNC: 9.6 MG/DL (ref 8.6–10.6)
CHLORIDE SERPL-SCNC: 103 MMOL/L (ref 98–107)
CHOLEST SERPL-MCNC: 164 MG/DL (ref 0–199)
CHOLESTEROL/HDL RATIO: 3.2
CO2 SERPL-SCNC: 29 MMOL/L (ref 21–32)
CREAT SERPL-MCNC: 1.65 MG/DL (ref 0.5–1.05)
EGFRCR SERPLBLD CKD-EPI 2021: 31 ML/MIN/1.73M*2
ERYTHROCYTE [DISTWIDTH] IN BLOOD BY AUTOMATED COUNT: 13 % (ref 11.5–14.5)
GLUCOSE SERPL-MCNC: 98 MG/DL (ref 74–99)
HCT VFR BLD AUTO: 35.2 % (ref 36–46)
HDLC SERPL-MCNC: 50.5 MG/DL
HGB BLD-MCNC: 10.9 G/DL (ref 12–16)
LDLC SERPL CALC-MCNC: 96 MG/DL
MCH RBC QN AUTO: 30.4 PG (ref 26–34)
MCHC RBC AUTO-ENTMCNC: 31 G/DL (ref 32–36)
MCV RBC AUTO: 98 FL (ref 80–100)
NON HDL CHOLESTEROL: 114 MG/DL (ref 0–149)
NRBC BLD-RTO: 0 /100 WBCS (ref 0–0)
PLATELET # BLD AUTO: 209 X10*3/UL (ref 150–450)
POTASSIUM SERPL-SCNC: 4 MMOL/L (ref 3.5–5.3)
PROT SERPL-MCNC: 7.2 G/DL (ref 6.4–8.2)
RBC # BLD AUTO: 3.59 X10*6/UL (ref 4–5.2)
SODIUM SERPL-SCNC: 141 MMOL/L (ref 136–145)
TRIGL SERPL-MCNC: 90 MG/DL (ref 0–149)
TSH SERPL-ACNC: 4.59 MIU/L (ref 0.44–3.98)
VLDL: 18 MG/DL (ref 0–40)
WBC # BLD AUTO: 6.1 X10*3/UL (ref 4.4–11.3)

## 2024-04-01 PROCEDURE — 80061 LIPID PANEL: CPT

## 2024-04-01 PROCEDURE — 84443 ASSAY THYROID STIM HORMONE: CPT

## 2024-04-01 PROCEDURE — 82306 VITAMIN D 25 HYDROXY: CPT

## 2024-04-01 PROCEDURE — 80053 COMPREHEN METABOLIC PANEL: CPT

## 2024-04-01 PROCEDURE — 85027 COMPLETE CBC AUTOMATED: CPT

## 2024-04-01 PROCEDURE — 36415 COLL VENOUS BLD VENIPUNCTURE: CPT

## 2024-04-12 ENCOUNTER — OFFICE VISIT (OUTPATIENT)
Dept: PRIMARY CARE | Facility: CLINIC | Age: 84
End: 2024-04-12
Payer: MEDICARE

## 2024-04-12 VITALS
BODY MASS INDEX: 22.58 KG/M2 | DIASTOLIC BLOOD PRESSURE: 70 MMHG | OXYGEN SATURATION: 98 % | SYSTOLIC BLOOD PRESSURE: 150 MMHG | WEIGHT: 100.4 LBS | TEMPERATURE: 97.6 F | HEART RATE: 62 BPM | HEIGHT: 56 IN

## 2024-04-12 DIAGNOSIS — D64.9 ANEMIA, UNSPECIFIED TYPE: ICD-10-CM

## 2024-04-12 DIAGNOSIS — Z00.00 ROUTINE GENERAL MEDICAL EXAMINATION AT HEALTH CARE FACILITY: ICD-10-CM

## 2024-04-12 DIAGNOSIS — M85.80 OTHER SPECIFIED DISORDERS OF BONE DENSITY AND STRUCTURE, UNSPECIFIED SITE: Primary | ICD-10-CM

## 2024-04-12 DIAGNOSIS — I10 HYPERTENSION, UNSPECIFIED TYPE: ICD-10-CM

## 2024-04-12 DIAGNOSIS — E78.5 HYPERLIPIDEMIA, UNSPECIFIED HYPERLIPIDEMIA TYPE: ICD-10-CM

## 2024-04-12 DIAGNOSIS — E03.9 HYPOTHYROIDISM, UNSPECIFIED TYPE: ICD-10-CM

## 2024-04-12 DIAGNOSIS — F03.90 DEMENTIA WITHOUT BEHAVIORAL DISTURBANCE, PSYCHOTIC DISTURBANCE, MOOD DISTURBANCE, OR ANXIETY, UNSPECIFIED DEMENTIA SEVERITY, UNSPECIFIED DEMENTIA TYPE (MULTI): ICD-10-CM

## 2024-04-12 DIAGNOSIS — M85.88 OTHER SPECIFIED DISORDERS OF BONE DENSITY AND STRUCTURE, OTHER SITE: ICD-10-CM

## 2024-04-12 DIAGNOSIS — N18.32 STAGE 3B CHRONIC KIDNEY DISEASE (MULTI): ICD-10-CM

## 2024-04-12 PROCEDURE — 3077F SYST BP >= 140 MM HG: CPT | Performed by: INTERNAL MEDICINE

## 2024-04-12 PROCEDURE — 1170F FXNL STATUS ASSESSED: CPT | Performed by: INTERNAL MEDICINE

## 2024-04-12 PROCEDURE — 1126F AMNT PAIN NOTED NONE PRSNT: CPT | Performed by: INTERNAL MEDICINE

## 2024-04-12 PROCEDURE — 1159F MED LIST DOCD IN RCRD: CPT | Performed by: INTERNAL MEDICINE

## 2024-04-12 PROCEDURE — 3078F DIAST BP <80 MM HG: CPT | Performed by: INTERNAL MEDICINE

## 2024-04-12 PROCEDURE — 90677 PCV20 VACCINE IM: CPT | Performed by: INTERNAL MEDICINE

## 2024-04-12 PROCEDURE — G0009 ADMIN PNEUMOCOCCAL VACCINE: HCPCS | Performed by: INTERNAL MEDICINE

## 2024-04-12 PROCEDURE — G0439 PPPS, SUBSEQ VISIT: HCPCS | Performed by: INTERNAL MEDICINE

## 2024-04-12 PROCEDURE — 1160F RVW MEDS BY RX/DR IN RCRD: CPT | Performed by: INTERNAL MEDICINE

## 2024-04-12 PROCEDURE — 1036F TOBACCO NON-USER: CPT | Performed by: INTERNAL MEDICINE

## 2024-04-12 RX ORDER — LEVOTHYROXINE SODIUM 50 UG/1
50 TABLET ORAL DAILY
Qty: 90 TABLET | Refills: 0 | Status: SHIPPED | OUTPATIENT
Start: 2024-04-12 | End: 2024-06-11 | Stop reason: SDUPTHER

## 2024-04-12 RX ORDER — ATORVASTATIN CALCIUM 20 MG/1
20 TABLET, FILM COATED ORAL DAILY
Qty: 90 TABLET | Refills: 3 | Status: SHIPPED | OUTPATIENT
Start: 2024-04-12

## 2024-04-12 RX ORDER — AMLODIPINE BESYLATE 10 MG/1
10 TABLET ORAL DAILY
Qty: 90 TABLET | Refills: 3 | Status: SHIPPED | OUTPATIENT
Start: 2024-04-12

## 2024-04-12 RX ORDER — LISINOPRIL 40 MG/1
40 TABLET ORAL DAILY
Qty: 90 TABLET | Refills: 1 | Status: SHIPPED | OUTPATIENT
Start: 2024-04-12

## 2024-04-12 ASSESSMENT — PATIENT HEALTH QUESTIONNAIRE - PHQ9
SUM OF ALL RESPONSES TO PHQ9 QUESTIONS 1 & 2: 0
1. LITTLE INTEREST OR PLEASURE IN DOING THINGS: NOT AT ALL
2. FEELING DOWN, DEPRESSED OR HOPELESS: NOT AT ALL

## 2024-04-12 ASSESSMENT — ACTIVITIES OF DAILY LIVING (ADL)
TAKING_MEDICATION: TOTAL CARE
MANAGING_FINANCES: TOTAL CARE
DOING_HOUSEWORK: NEEDS ASSISTANCE
BATHING: INDEPENDENT
GROCERY_SHOPPING: TOTAL CARE
DRESSING: INDEPENDENT

## 2024-04-12 ASSESSMENT — PAIN SCALES - GENERAL: PAINLEVEL: 0-NO PAIN

## 2024-04-12 NOTE — PROGRESS NOTES
"Chief Complaint   Patient presents with    Medicare Annual Wellness Visit Subsequent     Pt here for AWV and 2 mo FUV.         83 y.o. for AWV  Last visit January 2024. Accompanied by daughter.   Denies chest pain, sob, no GI complaints  +urinary incontinence  Compliant with medications.     Past Medical History  HTN  hyperlipidemia  Hypothyroidism tsh  4/2024 abnormal  colon polyp scope 2020  Osteopenia dexa 11/2021 T -3.1  myalgia secondary to statin  cataract ou sx  B12 deficiency  nightmares Dr Quintanilla, Dr Gore [facility]- ordered sleep study  VANGIE-could not tolerate treatment  CKD Dr Tillman.   abnormal CT abd per GI  pelvis US 12/2020 somewhat complex lower uterine segment appearing cystic abnormality. evaluated by urologist.   cystoscopy was negative. Dr Jewell  Dementia  fall, change in mental status, admission 12/2022 (orthostatic, abnormal ua) Dr Mendoza     , Nonsmoker    Blood pressure 140/68, pulse 62, temperature 36.4 °C (97.6 °F), height 1.422 m (4' 8\"), weight 45.5 kg (100 lb 6.4 oz), SpO2 98%.  Body mass index is 22.51 kg/m².    Physical Examination  General: NAD. Alert.   HEENT: Normocephalic atraumatic.    Eyes: no scleral icterus. Extraocular movements intact.  Pupils equal round and reactive to light.  Ears: TM intact.  No cerumen. +hearing loss  Throat: No exudate  Neck:  Supple. No thyroid goiter.  Lymph nodes: No cervical or clavicular adenopathy  Cardiovascular: Regular rate rhythm S1-S2 normal no murmur. No carotid bruit.   Lungs: Clear to Auscultation without wheezing, rales, or rhonchi, Breath sounds symmetric. No use of accessory muscles  Abdomen:  Normoactive bowel sounds, soft, non-tender. No mass.  No organomegaly  Extremities: No pretibial edema  Neuro: no facial asymmetry. Strength upper and lower extremities 5/5. Sensation intact to light touch. No tremor. Babinski negative  Skin: no rash noted  Vascular: DP pulses intact.   Breast: Both are symmetrical no nipple discharge.  No " "skin changes.  No mass palpated.  No axillary adenopathy.    No results found for: \"HGBA1C\"  Lab Results   Component Value Date    GLUCOSE 98 04/01/2024    CALCIUM 9.6 04/01/2024     04/01/2024    K 4.0 04/01/2024    CO2 29 04/01/2024     04/01/2024    BUN 21 04/01/2024    CREATININE 1.65 (H) 04/01/2024     Lab Results   Component Value Date    ALT 15 04/01/2024    AST 24 04/01/2024    ALKPHOS 32 (L) 04/01/2024    BILITOT 0.6 04/01/2024     Lab Results   Component Value Date    WBC 6.1 04/01/2024    HGB 10.9 (L) 04/01/2024    HCT 35.2 (L) 04/01/2024    MCV 98 04/01/2024     04/01/2024     Lab Results   Component Value Date    CHOL 164 04/01/2024    CHOL 145 07/06/2023    CHOL 148 06/09/2022     Lab Results   Component Value Date    HDL 50.5 04/01/2024    HDL 49.3 07/06/2023    HDL 54.8 06/09/2022     Lab Results   Component Value Date    LDLCALC 96 04/01/2024     Lab Results   Component Value Date    TRIG 90 04/01/2024    TRIG 92 07/06/2023    TRIG 78 06/09/2022     No components found for: \"CHOLHDL\"      ASSESSMENT/PLAN  AWV  Living Will: has a living will  Cognition/Memory if 65 or above: recall 3/3  Hepatitis C (18-79) n/a  HIV (18-64, once) n/a  Declines mammogram  DEXA November 2021  Colonoscopy August 2020  Immunization: prevnar today  Depression: denies    If Smoker/Former smoker: screen US aorta (man 65-75) n/a  Age 50-75, 20 pack year history, quit within 15 years or currently smoking  (medicare 55-77, 30 pack year) n/a    I  1. Other specified disorders of bone density and structure, unspecified site    - XR DEXA bone density; Future    2. Hypothyroidism, unspecified type    - Thyroid Stimulating Hormone; Future  - levothyroxine (Synthroid, Levoxyl) 50 mcg tablet; Take 1 tablet (50 mcg) by mouth once daily.  Dispense: 90 tablet; Refill: 0    3. Hypertension, unspecified type  Increase amlodipine from 7.5 mg to 10 mg every day.  - lisinopril 40 mg tablet; Take 1 tablet (40 mg) by mouth " once daily.  Dispense: 90 tablet; Refill: 1  F/up 3 months to evaluate bp  4. Hyperlipidemia, unspecified hyperlipidemia type  - atorvastatin (Lipitor) 20 mg tablet; Take 1 tablet (20 mg) by mouth once daily.  Dispense: 90 tablet; Refill: 3    5. Other specified disorders of bone density and structure, other site    - XR DEXA bone density; Future    6. Anemia, unspecified type  chronic. Stable.   Ckd. Reviewed note from Dr Tillman. F/up July    7. Routine general medical examination at health care facility      8. Stage 3b chronic kidney disease (Multi)  Has fup with nephrologist in July  Discussed CKD can cause anemia    9. Dementia without behavioral disturbance, psychotic disturbance, mood disturbance, or anxiety, unspecified dementia severity, unspecified dementia type (Multi)  Stable. Good family support. Accompanied by daughter today.     Current Outpatient Medications on File Prior to Visit   Medication Sig Dispense Refill    alendronate (Fosamax) 70 mg tablet Take 1 tablet by mouth one time a week. in the AM with glass of water on empty stomach. Do not take anything else by mouth or lie down for 30 min 12 tablet 3    amLODIPine (Norvasc) 2.5 mg tablet Take 1 tablet (2.5 mg) by mouth once daily. TAKE WITH 5 MG TABLET FOR 7.5 MG TOTAL 90 tablet 3    amLODIPine (Norvasc) 5 mg tablet One po at bedtime. Take with 2.5 mg tablet. 90 tablet 3    ammonium lactate (Lac-Hydrin) 12 % lotion Apply topically daily. apply to affected area      atenolol (Tenormin) 25 mg tablet Take 1 tablet (25 mg) by mouth twice a day.      atorvastatin (Lipitor) 20 mg tablet Take 1 tablet (20 mg) by mouth once daily.      cholecalciferol (Vitamin D-3) 25 MCG (1000 UT) capsule Take 1 capsule (25 mcg) by mouth once daily.      cranberry 500 mg capsule Take 1 capsule by mouth once daily.      cyanocobalamin, vitamin B-12, 1,000 mcg tablet, sublingual Place 1 tablet (1,000 mcg) under the tongue once daily. As directed.      DOCOSAHEXAENOIC  ACID-EPA ORAL Take 1 capsule by mouth once daily.      dorzolamide (Trusopt) 2 % ophthalmic solution Administer 1 drop into both eyes 2 times a day.      eszopiclone (Lunesta) 1 mg tablet Take 1 tablet (1 mg) by mouth as needed at bedtime.      fluocinonide (Lidex) 0.05 % external solution APPLY AS NEEDED FOR ECZEMA      L.acidoph,saliva/B.bif/S.therm (ACIDOPHILUS PROBIOTIC BLEND ORAL) Take 1 capsule by mouth once daily.      latanoprost (Xalatan) 0.005 % ophthalmic solution Administer 1 drop into both eyes once daily at bedtime.      levothyroxine (Synthroid, Levoxyl) 25 mcg tablet Take 1 tablet (25 mcg) by mouth once daily. 90 tablet 2    lisinopril 40 mg tablet Take 1 tablet (40 mg) by mouth once daily. 90 tablet 1    triamcinolone (Kenalog) 0.1 % cream if needed.      calcitriol (Rocaltrol) 0.25 mcg capsule Take 1 capsule (0.25 mcg) by mouth. every Monday,Wednesday,Friday.       No current facility-administered medications on file prior to visit.

## 2024-04-17 DIAGNOSIS — I10 HYPERTENSION, UNSPECIFIED TYPE: ICD-10-CM

## 2024-04-17 RX ORDER — ATENOLOL 25 MG/1
25 TABLET ORAL 2 TIMES DAILY
Qty: 180 TABLET | Refills: 1 | Status: SHIPPED | OUTPATIENT
Start: 2024-04-17

## 2024-04-19 ENCOUNTER — HOSPITAL ENCOUNTER (OUTPATIENT)
Dept: RADIOLOGY | Facility: CLINIC | Age: 84
Discharge: HOME | End: 2024-04-19
Payer: MEDICARE

## 2024-04-19 DIAGNOSIS — M85.88 OTHER SPECIFIED DISORDERS OF BONE DENSITY AND STRUCTURE, OTHER SITE: ICD-10-CM

## 2024-04-19 DIAGNOSIS — M85.80 OTHER SPECIFIED DISORDERS OF BONE DENSITY AND STRUCTURE, UNSPECIFIED SITE: ICD-10-CM

## 2024-04-19 PROCEDURE — 77080 DXA BONE DENSITY AXIAL: CPT | Performed by: RADIOLOGY

## 2024-04-19 PROCEDURE — 77080 DXA BONE DENSITY AXIAL: CPT

## 2024-05-07 ENCOUNTER — PATIENT MESSAGE (OUTPATIENT)
Dept: PRIMARY CARE | Facility: CLINIC | Age: 84
End: 2024-05-07
Payer: MEDICARE

## 2024-05-07 DIAGNOSIS — L60.0 INGROWN TOENAIL: Primary | ICD-10-CM

## 2024-05-08 ENCOUNTER — TELEPHONE (OUTPATIENT)
Dept: PRIMARY CARE | Facility: CLINIC | Age: 84
End: 2024-05-08
Payer: MEDICARE

## 2024-06-11 ENCOUNTER — LAB (OUTPATIENT)
Dept: LAB | Facility: LAB | Age: 84
End: 2024-06-11
Payer: MEDICARE

## 2024-06-11 DIAGNOSIS — E03.9 HYPOTHYROIDISM, UNSPECIFIED TYPE: ICD-10-CM

## 2024-06-11 DIAGNOSIS — N18.9 CHRONIC KIDNEY DISEASE, UNSPECIFIED CKD STAGE: ICD-10-CM

## 2024-06-11 LAB
ANION GAP SERPL CALC-SCNC: 15 MMOL/L (ref 10–20)
BUN SERPL-MCNC: 19 MG/DL (ref 6–23)
CALCIUM SERPL-MCNC: 9.8 MG/DL (ref 8.6–10.6)
CHLORIDE SERPL-SCNC: 102 MMOL/L (ref 98–107)
CO2 SERPL-SCNC: 29 MMOL/L (ref 21–32)
CREAT SERPL-MCNC: 1.67 MG/DL (ref 0.5–1.05)
EGFRCR SERPLBLD CKD-EPI 2021: 31 ML/MIN/1.73M*2
GLUCOSE SERPL-MCNC: 95 MG/DL (ref 74–99)
POTASSIUM SERPL-SCNC: 3.6 MMOL/L (ref 3.5–5.3)
SODIUM SERPL-SCNC: 142 MMOL/L (ref 136–145)
TSH SERPL-ACNC: 1.21 MIU/L (ref 0.44–3.98)

## 2024-06-11 PROCEDURE — 84443 ASSAY THYROID STIM HORMONE: CPT

## 2024-06-11 PROCEDURE — 36415 COLL VENOUS BLD VENIPUNCTURE: CPT

## 2024-06-11 PROCEDURE — 80048 BASIC METABOLIC PNL TOTAL CA: CPT

## 2024-06-11 RX ORDER — LEVOTHYROXINE SODIUM 50 UG/1
50 TABLET ORAL DAILY
Qty: 90 TABLET | Refills: 3 | Status: SHIPPED | OUTPATIENT
Start: 2024-06-11 | End: 2025-06-11

## 2024-07-12 ENCOUNTER — APPOINTMENT (OUTPATIENT)
Dept: PRIMARY CARE | Facility: CLINIC | Age: 84
End: 2024-07-12
Payer: MEDICARE

## 2024-07-17 PROBLEM — H61.20 IMPACTED CERUMEN: Status: ACTIVE | Noted: 2023-11-02

## 2024-07-17 PROBLEM — M85.80 OSTEOPENIA: Status: ACTIVE | Noted: 2024-07-17

## 2024-07-17 PROBLEM — I10 HYPERTENSION: Status: ACTIVE | Noted: 2019-01-07

## 2024-07-17 PROBLEM — G45.9 TRANSIENT ISCHEMIC ATTACK: Status: ACTIVE | Noted: 2023-11-02

## 2024-07-17 PROBLEM — I65.29 STENOSIS OF CAROTID ARTERY: Status: ACTIVE | Noted: 2018-09-05

## 2024-07-17 RX ORDER — ESZOPICLONE 1 MG/1
1 TABLET, FILM COATED ORAL NIGHTLY PRN
COMMUNITY
Start: 2023-05-18

## 2024-07-17 RX ORDER — ALENDRONATE SODIUM 70 MG/1
TABLET ORAL
COMMUNITY
Start: 2024-07-03

## 2024-07-19 ENCOUNTER — APPOINTMENT (OUTPATIENT)
Dept: PRIMARY CARE | Facility: CLINIC | Age: 84
End: 2024-07-19
Payer: MEDICARE

## 2024-07-19 VITALS
HEIGHT: 56 IN | WEIGHT: 96 LBS | DIASTOLIC BLOOD PRESSURE: 54 MMHG | BODY MASS INDEX: 21.59 KG/M2 | HEART RATE: 66 BPM | SYSTOLIC BLOOD PRESSURE: 110 MMHG

## 2024-07-19 DIAGNOSIS — N18.32 STAGE 3B CHRONIC KIDNEY DISEASE (MULTI): ICD-10-CM

## 2024-07-19 DIAGNOSIS — L30.9 ECZEMA, UNSPECIFIED TYPE: Primary | ICD-10-CM

## 2024-07-19 DIAGNOSIS — I65.8 OCCLUSION AND STENOSIS OF OTHER PRECEREBRAL ARTERIES: ICD-10-CM

## 2024-07-19 DIAGNOSIS — I10 HYPERTENSION, UNSPECIFIED TYPE: ICD-10-CM

## 2024-07-19 DIAGNOSIS — M81.0 OSTEOPOROSIS, UNSPECIFIED OSTEOPOROSIS TYPE, UNSPECIFIED PATHOLOGICAL FRACTURE PRESENCE: ICD-10-CM

## 2024-07-19 DIAGNOSIS — E03.9 HYPOTHYROIDISM, UNSPECIFIED TYPE: ICD-10-CM

## 2024-07-19 PROBLEM — H61.20 IMPACTED CERUMEN: Status: RESOLVED | Noted: 2023-11-02 | Resolved: 2024-07-19

## 2024-07-19 PROCEDURE — 3078F DIAST BP <80 MM HG: CPT | Performed by: INTERNAL MEDICINE

## 2024-07-19 PROCEDURE — 3074F SYST BP LT 130 MM HG: CPT | Performed by: INTERNAL MEDICINE

## 2024-07-19 PROCEDURE — 1158F ADVNC CARE PLAN TLK DOCD: CPT | Performed by: INTERNAL MEDICINE

## 2024-07-19 PROCEDURE — 1036F TOBACCO NON-USER: CPT | Performed by: INTERNAL MEDICINE

## 2024-07-19 PROCEDURE — 1123F ACP DISCUSS/DSCN MKR DOCD: CPT | Performed by: INTERNAL MEDICINE

## 2024-07-19 PROCEDURE — 99214 OFFICE O/P EST MOD 30 MIN: CPT | Performed by: INTERNAL MEDICINE

## 2024-07-19 PROCEDURE — 1159F MED LIST DOCD IN RCRD: CPT | Performed by: INTERNAL MEDICINE

## 2024-07-19 RX ORDER — FLUOCINONIDE TOPICAL SOLUTION USP, 0.05% 0.5 MG/ML
SOLUTION TOPICAL 2 TIMES DAILY PRN
Qty: 60 ML | Refills: 11 | Status: SHIPPED | OUTPATIENT
Start: 2024-07-19

## 2024-07-19 ASSESSMENT — PATIENT HEALTH QUESTIONNAIRE - PHQ9
SUM OF ALL RESPONSES TO PHQ9 QUESTIONS 1 AND 2: 0
1. LITTLE INTEREST OR PLEASURE IN DOING THINGS: NOT AT ALL
2. FEELING DOWN, DEPRESSED OR HOPELESS: NOT AT ALL

## 2024-07-19 NOTE — PROGRESS NOTES
"Chief Complaint   Patient presents with    Follow-up     bp       83 y.o. woman  Last visit 4/2024    Accompanied by daughter.   At that visit reported urinary incontinence  Patient denies chest pain, pressure, palpitations, or shortness of breath.  Patient notes no abdominal pain, nausea, and vomiting  Needs refill on lidex solution uses it for eczema.   Some ankle swelling. Variable. Not painful. No problem putting shoes on.     Past Medical History  HTN  hyperlipidemia  Hypothyroidism tsh  6/2024 normal  colon polyp scope 2020  Osteopenia dexa 11/2021 T -3.1, dexa 4/2024 T -2.9  myalgia secondary to statin  cataract ou sx  B12 deficiency  nightmares Dr Quintanilla, Dr Gore [facility]- ordered sleep study  VANGIE-could not tolerate treatment  CKD Dr Tillman.   abnormal CT abd per GI  pelvis US 12/2020 somewhat complex lower uterine segment appearing cystic abnormality. evaluated by urologist.   cystoscopy was negative. Dr Jewell  Dementia  fall, change in mental status, admission 12/2022 (orthostatic, abnormal ua) Dr Mendoza     , Nonsmoker    Blood pressure 110/54, pulse 66, height 1.422 m (4' 8\"), weight (!) 43.5 kg (96 lb).  Body mass index is 21.52 kg/m².    Vital reviewed  Neck: no cervical/clavicular adenopathy  CV: RRR S1 S2 normal. No murmur. +right carotid bruit. No left carotid bruit  Lungs: CTA without wrr. Breath sounds symmetric  Abdomen: normoactive. Soft, nontender. No mass.  Neuro: speech intact.     6/2024 TSH, BMP  4/2024 tsh, D, cbc, cmp, lipid    No results found for: \"HGBA1C\"  Lab Results   Component Value Date    GLUCOSE 95 06/11/2024    CALCIUM 9.8 06/11/2024     06/11/2024    K 3.6 06/11/2024    CO2 29 06/11/2024     06/11/2024    BUN 19 06/11/2024    CREATININE 1.67 (H) 06/11/2024     Lab Results   Component Value Date    ALT 15 04/01/2024    AST 24 04/01/2024    ALKPHOS 32 (L) 04/01/2024    BILITOT 0.6 04/01/2024     Lab Results   Component Value Date    WBC 6.1 04/01/2024    HGB " "10.9 (L) 04/01/2024    HCT 35.2 (L) 04/01/2024    MCV 98 04/01/2024     04/01/2024     Lab Results   Component Value Date    CHOL 164 04/01/2024    CHOL 145 07/06/2023    CHOL 148 06/09/2022     Lab Results   Component Value Date    HDL 50.5 04/01/2024    HDL 49.3 07/06/2023    HDL 54.8 06/09/2022     Lab Results   Component Value Date    LDLCALC 96 04/01/2024     Lab Results   Component Value Date    TRIG 90 04/01/2024    TRIG 92 07/06/2023    TRIG 78 06/09/2022     No components found for: \"CHOLHDL\"      ASSESSMENT/PLAN    1. Eczema, unspecified type  - fluocinonide (Lidex) 0.05 % external solution; Apply topically 2 times a day as needed for rash.  Dispense: 60 mL; Refill: 11    2. 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 Hypothyroidism, unspecified type  Recent tsh within goal    4. Hypertension, unspecified type  Well controlled brought list of home readings that are all within goal.  Patient asymptomatic  - Comprehensive Metabolic Panel; Future  - CBC; Future  - Lipid Panel; Future    5. Stage 3b chronic kidney disease (Multi)  - Vitamin D 25-Hydroxy,Total (for eval of Vitamin D levels); Future    6. Osteoporosis, unspecified osteoporosis type, unspecified pathological fracture presence  - Vitamin D 25-Hydroxy,Total (for eval of Vitamin D levels); Future    Living Will: has a living will  Declines mammogram  DEXA 4/2024 T -2.9  Colonoscopy August 2020    Current Outpatient Medications on File Prior to Visit   Medication Sig Dispense Refill    alendronate (Fosamax) 70 mg tablet TAKE ONE TABLET ONCE WEEKLY IN THE MORNING WITH GLASS OF WATER ON AN EMPTY STOMACH. DO NOT TAKE ANYTHING ELSE OR LIE DOWN FOR 30 MINUTES      amLODIPine (Norvasc) 10 mg tablet Take 1 tablet (10 mg) by mouth once daily. 90 tablet 3    ammonium lactate (Lac-Hydrin) 12 % lotion Apply topically daily. apply to affected area      atenolol (Tenormin) 25 mg tablet " Take 1 tablet (25 mg) by mouth 2 times a day. 180 tablet 1    atorvastatin (Lipitor) 20 mg tablet Take 1 tablet (20 mg) by mouth once daily. 90 tablet 3    cholecalciferol (Vitamin D-3) 25 MCG (1000 UT) capsule Take 1 capsule (25 mcg) by mouth once daily.      cranberry 500 mg capsule Take 1 capsule by mouth once daily.      cyanocobalamin, vitamin B-12, 1,000 mcg tablet, sublingual Place 1 tablet (1,000 mcg) under the tongue once daily. As directed.      dorzolamide (Trusopt) 2 % ophthalmic solution Administer 1 drop into both eyes 2 times a day.      fluocinonide (Lidex) 0.05 % external solution APPLY AS NEEDED FOR ECZEMA      L.acidoph,saliva/B.bif/S.therm (ACIDOPHILUS PROBIOTIC BLEND ORAL) Take 1 capsule by mouth once daily.      latanoprost (Xalatan) 0.005 % ophthalmic solution Administer 1 drop into both eyes once daily at bedtime.      levothyroxine (Synthroid, Levoxyl) 50 mcg tablet Take 1 tablet (50 mcg) by mouth once daily. 90 tablet 3    lisinopril 40 mg tablet Take 1 tablet (40 mg) by mouth once daily. 90 tablet 1    triamcinolone (Kenalog) 0.1 % cream if needed.      calcitriol (Rocaltrol) 0.25 mcg capsule Take 1 capsule (0.25 mcg) by mouth. every Monday,Wednesday,Friday.      eszopiclone (Lunesta) 1 mg tablet Take 1 tablet (1 mg) by mouth as needed at bedtime.       No current facility-administered medications on file prior to visit.

## 2024-07-29 DIAGNOSIS — E03.9 HYPOTHYROIDISM, UNSPECIFIED TYPE: ICD-10-CM

## 2024-07-29 RX ORDER — LEVOTHYROXINE SODIUM 50 UG/1
50 TABLET ORAL DAILY
Qty: 90 TABLET | Refills: 3 | Status: SHIPPED | OUTPATIENT
Start: 2024-07-29 | End: 2025-07-29

## 2024-08-19 ENCOUNTER — APPOINTMENT (OUTPATIENT)
Dept: CARDIOLOGY | Facility: CLINIC | Age: 84
End: 2024-08-19
Payer: MEDICARE

## 2024-08-23 ENCOUNTER — APPOINTMENT (OUTPATIENT)
Dept: PRIMARY CARE | Facility: CLINIC | Age: 84
End: 2024-08-23
Payer: MEDICARE

## 2024-08-23 VITALS
WEIGHT: 96.8 LBS | HEIGHT: 56 IN | SYSTOLIC BLOOD PRESSURE: 120 MMHG | TEMPERATURE: 98.2 F | HEART RATE: 64 BPM | BODY MASS INDEX: 21.78 KG/M2 | DIASTOLIC BLOOD PRESSURE: 52 MMHG | OXYGEN SATURATION: 98 %

## 2024-08-23 DIAGNOSIS — I10 HYPERTENSION, UNSPECIFIED TYPE: Primary | ICD-10-CM

## 2024-08-23 DIAGNOSIS — R60.9 EDEMA, UNSPECIFIED TYPE: ICD-10-CM

## 2024-08-23 PROCEDURE — 1159F MED LIST DOCD IN RCRD: CPT | Performed by: INTERNAL MEDICINE

## 2024-08-23 PROCEDURE — 1036F TOBACCO NON-USER: CPT | Performed by: INTERNAL MEDICINE

## 2024-08-23 PROCEDURE — 1123F ACP DISCUSS/DSCN MKR DOCD: CPT | Performed by: INTERNAL MEDICINE

## 2024-08-23 PROCEDURE — 3078F DIAST BP <80 MM HG: CPT | Performed by: INTERNAL MEDICINE

## 2024-08-23 PROCEDURE — 1126F AMNT PAIN NOTED NONE PRSNT: CPT | Performed by: INTERNAL MEDICINE

## 2024-08-23 PROCEDURE — 3074F SYST BP LT 130 MM HG: CPT | Performed by: INTERNAL MEDICINE

## 2024-08-23 PROCEDURE — 99214 OFFICE O/P EST MOD 30 MIN: CPT | Performed by: INTERNAL MEDICINE

## 2024-08-23 PROCEDURE — 1158F ADVNC CARE PLAN TLK DOCD: CPT | Performed by: INTERNAL MEDICINE

## 2024-08-23 RX ORDER — FUROSEMIDE 20 MG/1
TABLET ORAL
Qty: 2 TABLET | Refills: 0 | Status: SHIPPED | OUTPATIENT
Start: 2024-08-23

## 2024-08-23 RX ORDER — AMLODIPINE BESYLATE 5 MG/1
TABLET ORAL
Qty: 90 TABLET | Refills: 3 | Status: SHIPPED | OUTPATIENT
Start: 2024-08-23

## 2024-08-23 ASSESSMENT — PAIN SCALES - GENERAL: PAINLEVEL: 0-NO PAIN

## 2024-08-23 ASSESSMENT — PATIENT HEALTH QUESTIONNAIRE - PHQ9
1. LITTLE INTEREST OR PLEASURE IN DOING THINGS: NOT AT ALL
SUM OF ALL RESPONSES TO PHQ9 QUESTIONS 1 & 2: 0
2. FEELING DOWN, DEPRESSED OR HOPELESS: NOT AT ALL

## 2024-08-23 NOTE — PROGRESS NOTES
"Chief Complaint   Patient presents with    Leg Swelling     Pt here for swelling to bilateral ankles and lower legs, onset 2-3 weeks.       83 y.o. woman  Last visit  7/2024    Accompanied by daughter.    At that time-Some ankle swelling. Variable. Not painful. No problem putting shoes on.     Since last visit saw Dr Tillman.   No swelling at that time  Last 3 weeks having LE edema. Denies chest pain, pressure, palpitations, sob, or PND.  Eating chips sometimes  Not using compression stockings    Past Medical History  HTN  hyperlipidemia  Hypothyroidism tsh  6/2024 normal  colon polyp scope 2020  Osteopenia dexa 11/2021 T -3.1, dexa 4/2024 T -2.9  myalgia secondary to statin  cataract ou sx  B12 deficiency  nightmares Dr Quintanilla, Dr Gore [facility]- ordered sleep study  VANGIE-could not tolerate treatment  CKD Dr Tillman.   abnormal CT abd per GI  pelvis US 12/2020 somewhat complex lower uterine segment appearing cystic abnormality. evaluated by urologist.   cystoscopy was negative. Dr Jewell  Dementia  fall, change in mental status, admission 12/2022 (orthostatic, abnormal ua) Dr Mendoza     , Nonsmoker    Blood pressure 120/52, pulse 64, temperature 36.8 °C (98.2 °F), height 1.422 m (4' 8\"), weight (!) 43.9 kg (96 lb 12.8 oz), SpO2 98%.  Body mass index is 21.7 kg/m².  Weight stable.     Vital reviewed  Neck: no cervical/clavicular adenopathy  CV: RRR S1 S2 normal. No murmur.  No JVD  Lungs: CTA without wrr. Breath sounds symmetric  Abdomen: normoactive. Soft, nontender. No mass.  Extremities: +2 pitting pretibial edema  Neuro: speech intact.     6/2024 TSH, BMP  4/2024 tsh, D, cbc, cmp, lipid    No results found for: \"HGBA1C\"  Lab Results   Component Value Date    GLUCOSE 95 06/11/2024    CALCIUM 9.8 06/11/2024     06/11/2024    K 3.6 06/11/2024    CO2 29 06/11/2024     06/11/2024    BUN 19 06/11/2024    CREATININE 1.67 (H) 06/11/2024     Lab Results   Component Value Date    ALT 15 04/01/2024    AST 24 " "04/01/2024    ALKPHOS 32 (L) 04/01/2024    BILITOT 0.6 04/01/2024     Lab Results   Component Value Date    WBC 6.1 04/01/2024    HGB 10.9 (L) 04/01/2024    HCT 35.2 (L) 04/01/2024    MCV 98 04/01/2024     04/01/2024     Lab Results   Component Value Date    CHOL 164 04/01/2024    CHOL 145 07/06/2023    CHOL 148 06/09/2022     Lab Results   Component Value Date    HDL 50.5 04/01/2024    HDL 49.3 07/06/2023    HDL 54.8 06/09/2022     Lab Results   Component Value Date    LDLCALC 96 04/01/2024     Lab Results   Component Value Date    TRIG 90 04/01/2024    TRIG 92 07/06/2023    TRIG 78 06/09/2022     No components found for: \"CHOLHDL\"      ASSESSMENT/PLAN  1. Hypertension, unspecified type  Decrease amlodipine from 10 to 5 mg every day.continue to keep a list.   Reviewed readings from home and within goal.   - amLODIPine (Norvasc) 5 mg tablet; One po qhs  Dispense: 90 tablet; Refill: 3    2. Edema, unspecified type  Leg elevation.  Compression stockings  Lasix for 2 days then stop.  Decrease amlodipine dose-understands will need to closely watch bp. Has close follow up.   - furosemide (Lasix) 20 mg tablet; 1 po every day for 2 days then stop  Dispense: 2 tablet; Refill: 0    Living Will: has a living will  Declines mammogram  DEXA 4/2024 T -2.9  Colonoscopy August 2020    Current Outpatient Medications on File Prior to Visit   Medication Sig Dispense Refill    amLODIPine (Norvasc) 10 mg tablet Take 1 tablet (10 mg) by mouth once daily. 90 tablet 3    ammonium lactate (Lac-Hydrin) 12 % lotion Apply topically daily. apply to affected area      atenolol (Tenormin) 25 mg tablet Take 1 tablet (25 mg) by mouth 2 times a day. 180 tablet 1    atorvastatin (Lipitor) 20 mg tablet Take 1 tablet (20 mg) by mouth once daily. 90 tablet 3    cholecalciferol (Vitamin D-3) 25 MCG (1000 UT) capsule Take 1 capsule (25 mcg) by mouth once daily.      cranberry 500 mg capsule Take 1 capsule by mouth once daily.      cyanocobalamin, " vitamin B-12, 1,000 mcg tablet, sublingual Place 1 tablet (1,000 mcg) under the tongue once daily. As directed.      dorzolamide (Trusopt) 2 % ophthalmic solution Administer 1 drop into both eyes 2 times a day.      fluocinonide (Lidex) 0.05 % external solution Apply topically 2 times a day as needed for rash. 60 mL 11    L.acidoph,saliva/B.bif/S.therm (ACIDOPHILUS PROBIOTIC BLEND ORAL) Take 1 capsule by mouth once daily.      latanoprost (Xalatan) 0.005 % ophthalmic solution Administer 1 drop into both eyes once daily at bedtime.      levothyroxine (Synthroid, Levoxyl) 50 mcg tablet Take 1 tablet (50 mcg) by mouth once daily. 90 tablet 3    lisinopril 40 mg tablet Take 1 tablet (40 mg) by mouth once daily. 90 tablet 1    triamcinolone (Kenalog) 0.1 % cream if needed.      calcitriol (Rocaltrol) 0.25 mcg capsule Take 1 capsule (0.25 mcg) by mouth. every Monday,Wednesday,Friday.      [DISCONTINUED] alendronate (Fosamax) 70 mg tablet TAKE ONE TABLET ONCE WEEKLY IN THE MORNING WITH GLASS OF WATER ON AN EMPTY STOMACH. DO NOT TAKE ANYTHING ELSE OR LIE DOWN FOR 30 MINUTES      [DISCONTINUED] eszopiclone (Lunesta) 1 mg tablet Take 1 tablet (1 mg) by mouth as needed at bedtime.       No current facility-administered medications on file prior to visit.

## 2024-10-02 ENCOUNTER — HOSPITAL ENCOUNTER (OUTPATIENT)
Dept: CARDIOLOGY | Facility: CLINIC | Age: 84
Discharge: HOME | End: 2024-10-02
Payer: MEDICARE

## 2024-10-02 ENCOUNTER — LAB (OUTPATIENT)
Dept: LAB | Facility: LAB | Age: 84
End: 2024-10-02
Payer: MEDICARE

## 2024-10-02 DIAGNOSIS — N18.32 STAGE 3B CHRONIC KIDNEY DISEASE (MULTI): ICD-10-CM

## 2024-10-02 DIAGNOSIS — I65.8 OCCLUSION AND STENOSIS OF OTHER PRECEREBRAL ARTERIES: ICD-10-CM

## 2024-10-02 DIAGNOSIS — I10 HYPERTENSION, UNSPECIFIED TYPE: ICD-10-CM

## 2024-10-02 DIAGNOSIS — I65.9 OCCLUSION AND STENOSIS OF PRECEREBRAL ARTERY: ICD-10-CM

## 2024-10-02 DIAGNOSIS — M81.0 OSTEOPOROSIS, UNSPECIFIED OSTEOPOROSIS TYPE, UNSPECIFIED PATHOLOGICAL FRACTURE PRESENCE: ICD-10-CM

## 2024-10-02 LAB
25(OH)D3 SERPL-MCNC: 68 NG/ML (ref 30–100)
ALBUMIN SERPL BCP-MCNC: 4.3 G/DL (ref 3.4–5)
ALP SERPL-CCNC: 39 U/L (ref 33–136)
ALT SERPL W P-5'-P-CCNC: 8 U/L (ref 7–45)
ANION GAP SERPL CALC-SCNC: 12 MMOL/L (ref 10–20)
AST SERPL W P-5'-P-CCNC: 17 U/L (ref 9–39)
BILIRUB SERPL-MCNC: 0.8 MG/DL (ref 0–1.2)
BUN SERPL-MCNC: 29 MG/DL (ref 6–23)
CALCIUM SERPL-MCNC: 9.2 MG/DL (ref 8.6–10.3)
CHLORIDE SERPL-SCNC: 101 MMOL/L (ref 98–107)
CHOLEST SERPL-MCNC: 164 MG/DL (ref 0–199)
CHOLESTEROL/HDL RATIO: 3.7
CO2 SERPL-SCNC: 29 MMOL/L (ref 21–32)
CREAT SERPL-MCNC: 2.03 MG/DL (ref 0.5–1.05)
EGFRCR SERPLBLD CKD-EPI 2021: 24 ML/MIN/1.73M*2
ERYTHROCYTE [DISTWIDTH] IN BLOOD BY AUTOMATED COUNT: 12.5 % (ref 11.5–14.5)
GLUCOSE SERPL-MCNC: 79 MG/DL (ref 74–99)
HCT VFR BLD AUTO: 31.2 % (ref 36–46)
HDLC SERPL-MCNC: 44.9 MG/DL
HGB BLD-MCNC: 10.5 G/DL (ref 12–16)
LDLC SERPL CALC-MCNC: 96 MG/DL
MCH RBC QN AUTO: 32.4 PG (ref 26–34)
MCHC RBC AUTO-ENTMCNC: 33.7 G/DL (ref 32–36)
MCV RBC AUTO: 96 FL (ref 80–100)
NON HDL CHOLESTEROL: 119 MG/DL (ref 0–149)
NRBC BLD-RTO: 0 /100 WBCS (ref 0–0)
PLATELET # BLD AUTO: 244 X10*3/UL (ref 150–450)
POTASSIUM SERPL-SCNC: 3.9 MMOL/L (ref 3.5–5.3)
PROT SERPL-MCNC: 7.1 G/DL (ref 6.4–8.2)
RBC # BLD AUTO: 3.24 X10*6/UL (ref 4–5.2)
SODIUM SERPL-SCNC: 138 MMOL/L (ref 136–145)
TRIGL SERPL-MCNC: 114 MG/DL (ref 0–149)
VLDL: 23 MG/DL (ref 0–40)
WBC # BLD AUTO: 6.3 X10*3/UL (ref 4.4–11.3)

## 2024-10-02 PROCEDURE — 93880 EXTRACRANIAL BILAT STUDY: CPT

## 2024-10-02 PROCEDURE — 93880 EXTRACRANIAL BILAT STUDY: CPT | Performed by: INTERNAL MEDICINE

## 2024-10-02 PROCEDURE — 36415 COLL VENOUS BLD VENIPUNCTURE: CPT

## 2024-10-02 PROCEDURE — 80061 LIPID PANEL: CPT

## 2024-10-02 PROCEDURE — 80053 COMPREHEN METABOLIC PANEL: CPT

## 2024-10-02 PROCEDURE — 82306 VITAMIN D 25 HYDROXY: CPT

## 2024-10-02 PROCEDURE — 85027 COMPLETE CBC AUTOMATED: CPT

## 2024-10-11 ENCOUNTER — APPOINTMENT (OUTPATIENT)
Dept: PRIMARY CARE | Facility: CLINIC | Age: 84
End: 2024-10-11
Payer: MEDICARE

## 2024-10-18 ENCOUNTER — APPOINTMENT (OUTPATIENT)
Dept: PRIMARY CARE | Facility: CLINIC | Age: 84
End: 2024-10-18
Payer: MEDICARE

## 2024-10-18 VITALS
TEMPERATURE: 98.2 F | SYSTOLIC BLOOD PRESSURE: 148 MMHG | OXYGEN SATURATION: 96 % | BODY MASS INDEX: 22.13 KG/M2 | DIASTOLIC BLOOD PRESSURE: 60 MMHG | HEIGHT: 56 IN | HEART RATE: 63 BPM | WEIGHT: 98.4 LBS

## 2024-10-18 DIAGNOSIS — I10 HYPERTENSION, UNSPECIFIED TYPE: Primary | ICD-10-CM

## 2024-10-18 DIAGNOSIS — E78.5 HYPERLIPIDEMIA, UNSPECIFIED HYPERLIPIDEMIA TYPE: ICD-10-CM

## 2024-10-18 DIAGNOSIS — N18.32 STAGE 3B CHRONIC KIDNEY DISEASE (MULTI): ICD-10-CM

## 2024-10-18 DIAGNOSIS — E03.9 HYPOTHYROIDISM, UNSPECIFIED TYPE: ICD-10-CM

## 2024-10-18 PROCEDURE — 1160F RVW MEDS BY RX/DR IN RCRD: CPT | Performed by: INTERNAL MEDICINE

## 2024-10-18 PROCEDURE — 1158F ADVNC CARE PLAN TLK DOCD: CPT | Performed by: INTERNAL MEDICINE

## 2024-10-18 PROCEDURE — 1126F AMNT PAIN NOTED NONE PRSNT: CPT | Performed by: INTERNAL MEDICINE

## 2024-10-18 PROCEDURE — 99214 OFFICE O/P EST MOD 30 MIN: CPT | Performed by: INTERNAL MEDICINE

## 2024-10-18 PROCEDURE — 3077F SYST BP >= 140 MM HG: CPT | Performed by: INTERNAL MEDICINE

## 2024-10-18 PROCEDURE — 3078F DIAST BP <80 MM HG: CPT | Performed by: INTERNAL MEDICINE

## 2024-10-18 PROCEDURE — 1159F MED LIST DOCD IN RCRD: CPT | Performed by: INTERNAL MEDICINE

## 2024-10-18 PROCEDURE — 1123F ACP DISCUSS/DSCN MKR DOCD: CPT | Performed by: INTERNAL MEDICINE

## 2024-10-18 ASSESSMENT — PAIN SCALES - GENERAL: PAINLEVEL_OUTOF10: 0-NO PAIN

## 2024-10-18 NOTE — PROGRESS NOTES
"Chief Complaint   Patient presents with    Follow-up     Pt here for 3 mo FUV       83 y.o. woman  Last visit  8/2024    Accompanied by daughter.  Patient limited historian. Denies any complaints.      Past Medical History  HTN  hyperlipidemia  Hypothyroidism tsh  6/2024 normal  colon polyp scope 2020  Osteopenia dexa 11/2021 T -3.1, dexa 4/2024 T -2.9  myalgia secondary to statin  cataract ou sx  B12 deficiency  nightmares Dr Quintanilla, Dr Gore [facility]- ordered sleep study  VANGIE-could not tolerate treatment  CKD Dr Tillman.   abnormal CT abd per GI  pelvis US 12/2020 somewhat complex lower uterine segment appearing cystic abnormality. evaluated by urologist.   cystoscopy was negative. Dr Jewell  Dementia  fall, change in mental status, admission 12/2022 (orthostatic, abnormal ua) Dr Mendoza     , Nonsmoker    Blood pressure 148/60, pulse 63, temperature 36.8 °C (98.2 °F), height 1.422 m (4' 8\"), weight (!) 44.6 kg (98 lb 6.4 oz), SpO2 96%.  Body mass index is 22.06 kg/m².  Weight stable.     Vital reviewed  Neck: no cervical/clavicular adenopathy  CV: RRR S1 S2 normal. No murmur.  No JVD  Lungs: CTA without wrr. Breath sounds symmetric  Abdomen: normoactive. Soft, nontender. No mass.  Extremities: +2 pitting pretibial edema  Neuro: speech intact.     Labs 10/2024 lipid, D, cbc, cmp  D 68  No results found for: \"HGBA1C\"  Lab Results   Component Value Date    GLUCOSE 79 10/02/2024    CALCIUM 9.2 10/02/2024     10/02/2024    K 3.9 10/02/2024    CO2 29 10/02/2024     10/02/2024    BUN 29 (H) 10/02/2024    CREATININE 2.03 (H) 10/02/2024     Lab Results   Component Value Date    ALT 8 10/02/2024    AST 17 10/02/2024    ALKPHOS 39 10/02/2024    BILITOT 0.8 10/02/2024     Lab Results   Component Value Date    WBC 6.3 10/02/2024    HGB 10.5 (L) 10/02/2024    HCT 31.2 (L) 10/02/2024    MCV 96 10/02/2024     10/02/2024     Lab Results   Component Value Date    CHOL 164 10/02/2024    CHOL 164 04/01/2024    " "CHOL 145 07/06/2023     Lab Results   Component Value Date    HDL 44.9 10/02/2024    HDL 50.5 04/01/2024    HDL 49.3 07/06/2023     Lab Results   Component Value Date    LDLCALC 96 10/02/2024    LDLCALC 96 04/01/2024     Lab Results   Component Value Date    TRIG 114 10/02/2024    TRIG 90 04/01/2024    TRIG 92 07/06/2023     No components found for: \"CHOLHDL\"    6/2024 TSH, BMP  4/2024 tsh, D, cbc, cmp, lipid    ASSESSMENT/PLAN  1. Hypertension, unspecified type (Primary)  - Comprehensive Metabolic Panel; Future  - CBC; Future    2. Hypothyroidism, unspecified type  - TSH; Future    3. Hyperlipidemia, unspecified hyperlipidemia type  Continue atorvastatin    4. Stage 3b chronic kidney disease (Multi)  - Comprehensive Metabolic Panel; Future  - CBC; Future  - Vitamin D 25-Hydroxy,Total (for eval of Vitamin D levels); Future    Living Will: has a living will  Declines mammogram  DEXA 4/2024 T -2.9  Colonoscopy August 2020    Current Outpatient Medications on File Prior to Visit   Medication Sig Dispense Refill    amLODIPine (Norvasc) 5 mg tablet One po qhs 90 tablet 3    ammonium lactate (Lac-Hydrin) 12 % lotion Apply topically daily. apply to affected area      atenolol (Tenormin) 25 mg tablet Take 1 tablet (25 mg) by mouth 2 times a day. 180 tablet 1    atorvastatin (Lipitor) 20 mg tablet Take 1 tablet (20 mg) by mouth once daily. 90 tablet 3    cholecalciferol (Vitamin D-3) 25 MCG (1000 UT) capsule Take 1 capsule (25 mcg) by mouth once daily.      cranberry 500 mg capsule Take 1 capsule by mouth once daily.      cyanocobalamin, vitamin B-12, 1,000 mcg tablet, sublingual Place 1 tablet (1,000 mcg) under the tongue once daily. As directed.      dorzolamide (Trusopt) 2 % ophthalmic solution Administer 1 drop into both eyes 2 times a day.      fluocinonide (Lidex) 0.05 % external solution Apply topically 2 times a day as needed for rash. 60 mL 11    L.acidoph,saliva/B.bif/S.therm (ACIDOPHILUS PROBIOTIC BLEND ORAL) Take " 1 capsule by mouth once daily.      latanoprost (Xalatan) 0.005 % ophthalmic solution Administer 1 drop into both eyes once daily at bedtime.      levothyroxine (Synthroid, Levoxyl) 50 mcg tablet Take 1 tablet (50 mcg) by mouth once daily. 90 tablet 3    lisinopril 40 mg tablet Take 1 tablet (40 mg) by mouth once daily. 90 tablet 1    triamcinolone (Kenalog) 0.1 % cream if needed.      calcitriol (Rocaltrol) 0.25 mcg capsule Take 1 capsule (0.25 mcg) by mouth. every Monday,Wednesday,Friday.      furosemide (Lasix) 20 mg tablet 1 po every day for 2 days then stop (Patient not taking: Reported on 10/18/2024) 2 tablet 0     No current facility-administered medications on file prior to visit.

## 2024-10-21 ENCOUNTER — TELEPHONE (OUTPATIENT)
Dept: PRIMARY CARE | Facility: CLINIC | Age: 84
End: 2024-10-21
Payer: MEDICARE

## 2024-10-21 NOTE — TELEPHONE ENCOUNTER
----- Message from Flory Gage sent at 10/18/2024  3:05 PM EDT -----  Please contact DR Tillman's office and let him know Creatinine has increased to 2.03-family will call and see if he should see him prior to January.

## 2024-10-21 NOTE — TELEPHONE ENCOUNTER
"Called office of Dr. Tillman and spoke with Anayeli.  Informed of provider message, states she \"just spoke with her .\"  Lab results faxed to office at [phone]  "

## 2024-11-25 DIAGNOSIS — I10 HYPERTENSION, UNSPECIFIED TYPE: ICD-10-CM

## 2024-11-25 RX ORDER — ATENOLOL 25 MG/1
25 TABLET ORAL 2 TIMES DAILY
Qty: 180 TABLET | Refills: 3 | Status: SHIPPED | OUTPATIENT
Start: 2024-11-25

## 2024-11-28 DIAGNOSIS — I10 HYPERTENSION, UNSPECIFIED TYPE: ICD-10-CM

## 2024-11-29 RX ORDER — LISINOPRIL 40 MG/1
40 TABLET ORAL DAILY
Qty: 90 TABLET | Refills: 3 | Status: SHIPPED | OUTPATIENT
Start: 2024-11-29

## 2025-01-06 ENCOUNTER — LAB (OUTPATIENT)
Dept: LAB | Facility: LAB | Age: 85
End: 2025-01-06
Payer: MEDICARE

## 2025-01-06 DIAGNOSIS — N18.32 CHRONIC KIDNEY DISEASE, STAGE 3B (MULTI): Primary | ICD-10-CM

## 2025-01-06 DIAGNOSIS — N18.32 CHRONIC KIDNEY DISEASE, STAGE 3B (MULTI): ICD-10-CM

## 2025-01-06 DIAGNOSIS — N18.32 STAGE 3B CHRONIC KIDNEY DISEASE (MULTI): ICD-10-CM

## 2025-01-06 DIAGNOSIS — D63.1 ANEMIA IN CHRONIC KIDNEY DISEASE (CODE): ICD-10-CM

## 2025-01-06 DIAGNOSIS — E55.9 VITAMIN D DEFICIENCY, UNSPECIFIED: ICD-10-CM

## 2025-01-06 DIAGNOSIS — E03.9 HYPOTHYROIDISM, UNSPECIFIED TYPE: ICD-10-CM

## 2025-01-06 DIAGNOSIS — R80.8 OTHER PROTEINURIA: ICD-10-CM

## 2025-01-06 DIAGNOSIS — I10 HYPERTENSION, UNSPECIFIED TYPE: ICD-10-CM

## 2025-01-06 DIAGNOSIS — N18.9 CHRONIC KIDNEY DISEASE, UNSPECIFIED: Primary | ICD-10-CM

## 2025-01-06 LAB
25(OH)D3 SERPL-MCNC: 68 NG/ML (ref 30–100)
ALBUMIN SERPL BCP-MCNC: 4.2 G/DL (ref 3.4–5)
ALP SERPL-CCNC: 38 U/L (ref 33–136)
ALT SERPL W P-5'-P-CCNC: 10 U/L (ref 7–45)
ANION GAP SERPL CALC-SCNC: 14 MMOL/L (ref 10–20)
APPEARANCE UR: CLEAR
AST SERPL W P-5'-P-CCNC: 18 U/L (ref 9–39)
BASOPHILS # BLD AUTO: 0.04 X10*3/UL (ref 0–0.1)
BASOPHILS NFR BLD AUTO: 0.7 %
BILIRUB SERPL-MCNC: 0.5 MG/DL (ref 0–1.2)
BILIRUB UR STRIP.AUTO-MCNC: NEGATIVE MG/DL
BUN SERPL-MCNC: 21 MG/DL (ref 6–23)
CALCIUM SERPL-MCNC: 9.6 MG/DL (ref 8.6–10.6)
CHLORIDE SERPL-SCNC: 102 MMOL/L (ref 98–107)
CO2 SERPL-SCNC: 29 MMOL/L (ref 21–32)
COLOR UR: ABNORMAL
CREAT SERPL-MCNC: 1.51 MG/DL (ref 0.5–1.05)
CREAT UR-MCNC: 142.1 MG/DL (ref 20–320)
EGFRCR SERPLBLD CKD-EPI 2021: 34 ML/MIN/1.73M*2
EOSINOPHIL # BLD AUTO: 0.1 X10*3/UL (ref 0–0.4)
EOSINOPHIL NFR BLD AUTO: 1.8 %
ERYTHROCYTE [DISTWIDTH] IN BLOOD BY AUTOMATED COUNT: 12.8 % (ref 11.5–14.5)
GLUCOSE SERPL-MCNC: 97 MG/DL (ref 74–99)
GLUCOSE UR STRIP.AUTO-MCNC: NORMAL MG/DL
HCT VFR BLD AUTO: 33.5 % (ref 36–46)
HGB BLD-MCNC: 10.7 G/DL (ref 12–16)
HYALINE CASTS #/AREA URNS AUTO: ABNORMAL /LPF
IMM GRANULOCYTES # BLD AUTO: 0.02 X10*3/UL (ref 0–0.5)
IMM GRANULOCYTES NFR BLD AUTO: 0.4 % (ref 0–0.9)
KETONES UR STRIP.AUTO-MCNC: NEGATIVE MG/DL
LEUKOCYTE ESTERASE UR QL STRIP.AUTO: ABNORMAL
LYMPHOCYTES # BLD AUTO: 1.12 X10*3/UL (ref 0.8–3)
LYMPHOCYTES NFR BLD AUTO: 20.6 %
MAGNESIUM SERPL-MCNC: 2.1 MG/DL (ref 1.6–2.4)
MCH RBC QN AUTO: 31.1 PG (ref 26–34)
MCHC RBC AUTO-ENTMCNC: 31.9 G/DL (ref 32–36)
MCV RBC AUTO: 97 FL (ref 80–100)
MICROALBUMIN UR-MCNC: 35.3 MG/L
MICROALBUMIN/CREAT UR: 24.8 UG/MG CREAT
MONOCYTES # BLD AUTO: 0.42 X10*3/UL (ref 0.05–0.8)
MONOCYTES NFR BLD AUTO: 7.7 %
NEUTROPHILS # BLD AUTO: 3.74 X10*3/UL (ref 1.6–5.5)
NEUTROPHILS NFR BLD AUTO: 68.8 %
NITRITE UR QL STRIP.AUTO: NEGATIVE
NRBC BLD-RTO: 0 /100 WBCS (ref 0–0)
PH UR STRIP.AUTO: 5.5 [PH]
PHOSPHATE SERPL-MCNC: 4.2 MG/DL (ref 2.5–4.9)
PLATELET # BLD AUTO: 182 X10*3/UL (ref 150–450)
POTASSIUM SERPL-SCNC: 4.1 MMOL/L (ref 3.5–5.3)
PROT SERPL-MCNC: 7 G/DL (ref 6.4–8.2)
PROT UR STRIP.AUTO-MCNC: ABNORMAL MG/DL
PTH-INTACT SERPL-MCNC: 73.6 PG/ML (ref 18.5–88)
RBC # BLD AUTO: 3.44 X10*6/UL (ref 4–5.2)
RBC # UR STRIP.AUTO: NEGATIVE /UL
RBC #/AREA URNS AUTO: ABNORMAL /HPF
SODIUM SERPL-SCNC: 141 MMOL/L (ref 136–145)
SP GR UR STRIP.AUTO: 1.01
TSH SERPL-ACNC: 0.8 MIU/L (ref 0.44–3.98)
URATE SERPL-MCNC: 6.5 MG/DL (ref 2.3–6.7)
UROBILINOGEN UR STRIP.AUTO-MCNC: NORMAL MG/DL
WBC # BLD AUTO: 5.4 X10*3/UL (ref 4.4–11.3)
WBC #/AREA URNS AUTO: ABNORMAL /HPF

## 2025-01-06 PROCEDURE — 84100 ASSAY OF PHOSPHORUS: CPT

## 2025-01-06 PROCEDURE — 85025 COMPLETE CBC W/AUTO DIFF WBC: CPT

## 2025-01-06 PROCEDURE — 80053 COMPREHEN METABOLIC PANEL: CPT

## 2025-01-06 PROCEDURE — 84550 ASSAY OF BLOOD/URIC ACID: CPT

## 2025-01-06 PROCEDURE — 84443 ASSAY THYROID STIM HORMONE: CPT

## 2025-01-06 PROCEDURE — 81001 URINALYSIS AUTO W/SCOPE: CPT

## 2025-01-06 PROCEDURE — 82306 VITAMIN D 25 HYDROXY: CPT

## 2025-01-06 PROCEDURE — 83970 ASSAY OF PARATHORMONE: CPT

## 2025-01-06 PROCEDURE — 82570 ASSAY OF URINE CREATININE: CPT

## 2025-01-06 PROCEDURE — 82043 UR ALBUMIN QUANTITATIVE: CPT

## 2025-01-06 PROCEDURE — 83735 ASSAY OF MAGNESIUM: CPT

## 2025-01-17 ENCOUNTER — APPOINTMENT (OUTPATIENT)
Dept: PRIMARY CARE | Facility: CLINIC | Age: 85
End: 2025-01-17
Payer: MEDICARE

## 2025-01-17 VITALS
TEMPERATURE: 98.4 F | OXYGEN SATURATION: 98 % | HEIGHT: 56 IN | DIASTOLIC BLOOD PRESSURE: 78 MMHG | BODY MASS INDEX: 21.78 KG/M2 | HEART RATE: 64 BPM | SYSTOLIC BLOOD PRESSURE: 160 MMHG | WEIGHT: 96.8 LBS

## 2025-01-17 DIAGNOSIS — E78.5 HYPERLIPIDEMIA, UNSPECIFIED HYPERLIPIDEMIA TYPE: ICD-10-CM

## 2025-01-17 DIAGNOSIS — E03.9 HYPOTHYROIDISM, UNSPECIFIED TYPE: ICD-10-CM

## 2025-01-17 DIAGNOSIS — N18.32 STAGE 3B CHRONIC KIDNEY DISEASE (MULTI): Primary | ICD-10-CM

## 2025-01-17 PROCEDURE — 1036F TOBACCO NON-USER: CPT | Performed by: INTERNAL MEDICINE

## 2025-01-17 PROCEDURE — 1159F MED LIST DOCD IN RCRD: CPT | Performed by: INTERNAL MEDICINE

## 2025-01-17 PROCEDURE — 1126F AMNT PAIN NOTED NONE PRSNT: CPT | Performed by: INTERNAL MEDICINE

## 2025-01-17 PROCEDURE — 3078F DIAST BP <80 MM HG: CPT | Performed by: INTERNAL MEDICINE

## 2025-01-17 PROCEDURE — 3077F SYST BP >= 140 MM HG: CPT | Performed by: INTERNAL MEDICINE

## 2025-01-17 PROCEDURE — 99214 OFFICE O/P EST MOD 30 MIN: CPT | Performed by: INTERNAL MEDICINE

## 2025-01-17 PROCEDURE — 1123F ACP DISCUSS/DSCN MKR DOCD: CPT | Performed by: INTERNAL MEDICINE

## 2025-01-17 RX ORDER — ATORVASTATIN CALCIUM 20 MG/1
20 TABLET, FILM COATED ORAL DAILY
Qty: 90 TABLET | Refills: 3 | Status: SHIPPED | OUTPATIENT
Start: 2025-01-17

## 2025-01-17 RX ORDER — LEVOTHYROXINE SODIUM 50 UG/1
50 TABLET ORAL DAILY
Qty: 90 TABLET | Refills: 3 | Status: SHIPPED | OUTPATIENT
Start: 2025-01-17 | End: 2026-01-17

## 2025-01-17 ASSESSMENT — PAIN SCALES - GENERAL: PAINLEVEL_OUTOF10: 0-NO PAIN

## 2025-01-17 NOTE — PROGRESS NOTES
"Chief Complaint   Patient presents with    Follow-up     Pt here for 3 mo FUV       84 y.o. woman  Last visit  10/2024   Accompanied by daughter.  Patient limited historian.   Did not bring bp list. Memory about the same. Does like to read. Not doing puzzles.  Compliant with medication. Had some ham over the holidays.   Denies chest pain, pressure, palpitations, or sob.   Some chronic incontinence urine. Does kegel exercises.      Past Medical History  HTN  hyperlipidemia  Hypothyroidism tsh  01/2025  normal  colon polyp scope 2020  Osteopenia dexa 11/2021 T -3.1, dexa 4/2024 T -2.9  myalgia secondary to statin  cataract ou sx  B12 deficiency  nightmares Dr Quintanilla, Dr Gore [facility]- ordered sleep study  VANGIE-could not tolerate treatment  CKD Dr Tillman.   abnormal CT abd per GI  pelvis US 12/2020 somewhat complex lower uterine segment appearing cystic abnormality. evaluated by urologist.   cystoscopy was negative. Dr Jewell  Dementia  fall, change in mental status, admission 12/2022 (orthostatic, abnormal ua) Dr Mendoza   anemia    , Nonsmoker    Blood pressure 160/78, pulse 64, temperature 36.9 °C (98.4 °F), height 1.422 m (4' 8\"), weight (!) 43.9 kg (96 lb 12.8 oz), SpO2 98%.  Body mass index is 21.7 kg/m².  Weight stable.   Vital reviewed  Neck: no cervical/clavicular adenopathy  CV: RRR S1 S2 normal. No murmur.  No JVD  Lungs: CTA without wrr. Breath sounds symmetric.   Abdomen: normoactive. Soft, nontender. No mass.  Extremities:  pretibial edema  Neuro: speech intact.     No results found for: \"HGBA1C\"  Lab Results   Component Value Date    GLUCOSE 97 01/06/2025    CALCIUM 9.6 01/06/2025     01/06/2025    K 4.1 01/06/2025    CO2 29 01/06/2025     01/06/2025    BUN 21 01/06/2025    CREATININE 1.51 (H) 01/06/2025     Lab Results   Component Value Date    ALT 10 01/06/2025    AST 18 01/06/2025    ALKPHOS 38 01/06/2025    BILITOT 0.5 01/06/2025     Lab Results   Component Value Date    WBC 5.4 " "01/06/2025    HGB 10.7 (L) 01/06/2025    HCT 33.5 (L) 01/06/2025    MCV 97 01/06/2025     01/06/2025     Lab Results   Component Value Date    CHOL 164 10/02/2024    CHOL 164 04/01/2024    CHOL 145 07/06/2023     Lab Results   Component Value Date    HDL 44.9 10/02/2024    HDL 50.5 04/01/2024    HDL 49.3 07/06/2023     Lab Results   Component Value Date    LDLCALC 96 10/02/2024    LDLCALC 96 04/01/2024     Lab Results   Component Value Date    TRIG 114 10/02/2024    TRIG 90 04/01/2024    TRIG 92 07/06/2023     No components found for: \"CHOLHDL\"  Labs 10/2024 lipid, D, cbc, cmp  D 68 Cr 2.03    6/2024 TSH, BMP  4/2024 tsh, D, cbc, cmp, lipid    ASSESSMENT/PLAN  1. Hyperlipidemia, unspecified hyperlipidemia type  Cholesterol within goal. Future order.   - atorvastatin (Lipitor) 20 mg tablet; Take 1 tablet (20 mg) by mouth once daily.  Dispense: 90 tablet; Refill: 3  - Comprehensive Metabolic Panel; Future  - Lipid Panel; Future    2. Hypothyroidism, unspecified type  Last tsh within goal.   - levothyroxine (Synthroid, Levoxyl) 50 mcg tablet; Take 1 tablet (50 mcg) by mouth once daily.  Dispense: 90 tablet; Refill: 3    3. Stage 3b chronic kidney disease (Multi) (Primary)  Has f/up with nephrologist.   Daughter will check bp at home if normal continue same medications. If home reading with sbp >140 or dbp >90 call office and will then increase amlodipine. She does have close f/up with nephrologist for later this month.   - Comprehensive Metabolic Panel; Future  - CBC; Future    F/up June and prn  Living Will: has a living will  Declines mammogram  DEXA 4/2024 T -2.9  Colonoscopy August 2020    Current Outpatient Medications on File Prior to Visit   Medication Sig Dispense Refill    amLODIPine (Norvasc) 5 mg tablet One po qhs 90 tablet 3    ammonium lactate (Lac-Hydrin) 12 % lotion Apply topically daily. apply to affected area      atenolol (Tenormin) 25 mg tablet Take 1 tablet (25 mg) by mouth 2 times a day. 180 " tablet 3    atorvastatin (Lipitor) 20 mg tablet Take 1 tablet (20 mg) by mouth once daily. 90 tablet 3    calcitriol (Rocaltrol) 0.25 mcg capsule Take 1 capsule (0.25 mcg) by mouth. every Monday,Wednesday,Friday.      cholecalciferol (Vitamin D-3) 25 MCG (1000 UT) capsule Take 1 capsule (25 mcg) by mouth once daily.      cranberry 500 mg capsule Take 1 capsule by mouth once daily.      cyanocobalamin, vitamin B-12, 1,000 mcg tablet, sublingual Place 1 tablet (1,000 mcg) under the tongue once daily. As directed.      dorzolamide (Trusopt) 2 % ophthalmic solution Administer 1 drop into both eyes 2 times a day.      fluocinonide (Lidex) 0.05 % external solution Apply topically 2 times a day as needed for rash. 60 mL 11    L.acidoph,saliva/B.bif/S.therm (ACIDOPHILUS PROBIOTIC BLEND ORAL) Take 1 capsule by mouth once daily.      latanoprost (Xalatan) 0.005 % ophthalmic solution Administer 1 drop into both eyes once daily at bedtime.      levothyroxine (Synthroid, Levoxyl) 50 mcg tablet Take 1 tablet (50 mcg) by mouth once daily. 90 tablet 3    lisinopril 40 mg tablet TAKE ONE TABLET BY MOUTH EVERY DAY 90 tablet 3    triamcinolone (Kenalog) 0.1 % cream if needed.       No current facility-administered medications on file prior to visit.

## 2025-05-21 PROBLEM — G47.10 EXCESSIVE SLEEPINESS: Status: ACTIVE | Noted: 2025-05-21

## 2025-05-21 PROBLEM — I95.1 AUTONOMIC ORTHOSTATIC HYPOTENSION: Status: ACTIVE | Noted: 2025-05-21

## 2025-06-06 LAB
ALBUMIN SERPL-MCNC: 4.1 G/DL (ref 3.6–5.1)
ALP SERPL-CCNC: 37 U/L (ref 37–153)
ALT SERPL-CCNC: 9 U/L (ref 6–29)
ANION GAP SERPL CALCULATED.4IONS-SCNC: 7 MMOL/L (CALC) (ref 7–17)
AST SERPL-CCNC: 15 U/L (ref 10–35)
BILIRUB SERPL-MCNC: 0.5 MG/DL (ref 0.2–1.2)
BUN SERPL-MCNC: 24 MG/DL (ref 7–25)
CALCIUM SERPL-MCNC: 9.3 MG/DL (ref 8.6–10.4)
CHLORIDE SERPL-SCNC: 104 MMOL/L (ref 98–110)
CHOLEST SERPL-MCNC: 154 MG/DL
CHOLEST/HDLC SERPL: 3 (CALC)
CO2 SERPL-SCNC: 29 MMOL/L (ref 20–32)
CREAT SERPL-MCNC: 1.59 MG/DL (ref 0.6–0.95)
EGFRCR SERPLBLD CKD-EPI 2021: 32 ML/MIN/1.73M2
ERYTHROCYTE [DISTWIDTH] IN BLOOD BY AUTOMATED COUNT: 13.1 % (ref 11–15)
GLUCOSE SERPL-MCNC: 100 MG/DL (ref 65–99)
HCT VFR BLD AUTO: 33.3 % (ref 35–45)
HDLC SERPL-MCNC: 51 MG/DL
HGB BLD-MCNC: 10.7 G/DL (ref 11.7–15.5)
LDLC SERPL CALC-MCNC: 87 MG/DL (CALC)
MCH RBC QN AUTO: 32 PG (ref 27–33)
MCHC RBC AUTO-ENTMCNC: 32.1 G/DL (ref 32–36)
MCV RBC AUTO: 99.7 FL (ref 80–100)
NONHDLC SERPL-MCNC: 103 MG/DL (CALC)
PLATELET # BLD AUTO: 189 THOUSAND/UL (ref 140–400)
PMV BLD REES-ECKER: 10.3 FL (ref 7.5–12.5)
POTASSIUM SERPL-SCNC: 4.1 MMOL/L (ref 3.5–5.3)
PROT SERPL-MCNC: 6.8 G/DL (ref 6.1–8.1)
RBC # BLD AUTO: 3.34 MILLION/UL (ref 3.8–5.1)
SODIUM SERPL-SCNC: 140 MMOL/L (ref 135–146)
TRIGL SERPL-MCNC: 74 MG/DL
WBC # BLD AUTO: 4.7 THOUSAND/UL (ref 3.8–10.8)

## 2025-06-16 ENCOUNTER — APPOINTMENT (OUTPATIENT)
Dept: PRIMARY CARE | Facility: CLINIC | Age: 85
End: 2025-06-16
Payer: MEDICARE

## 2025-06-27 ENCOUNTER — OFFICE VISIT (OUTPATIENT)
Dept: PRIMARY CARE | Facility: CLINIC | Age: 85
End: 2025-06-27
Payer: MEDICARE

## 2025-06-27 VITALS
BODY MASS INDEX: 22.63 KG/M2 | OXYGEN SATURATION: 97 % | HEIGHT: 55 IN | TEMPERATURE: 99.3 F | SYSTOLIC BLOOD PRESSURE: 138 MMHG | HEART RATE: 69 BPM | WEIGHT: 97.8 LBS | DIASTOLIC BLOOD PRESSURE: 62 MMHG

## 2025-06-27 DIAGNOSIS — E03.9 HYPOTHYROIDISM, UNSPECIFIED TYPE: Primary | ICD-10-CM

## 2025-06-27 DIAGNOSIS — N18.32 STAGE 3B CHRONIC KIDNEY DISEASE (MULTI): ICD-10-CM

## 2025-06-27 DIAGNOSIS — H91.90 HEARING LOSS, UNSPECIFIED HEARING LOSS TYPE, UNSPECIFIED LATERALITY: ICD-10-CM

## 2025-06-27 DIAGNOSIS — D64.9 ANEMIA, UNSPECIFIED TYPE: ICD-10-CM

## 2025-06-27 DIAGNOSIS — I10 HYPERTENSION, UNSPECIFIED TYPE: ICD-10-CM

## 2025-06-27 DIAGNOSIS — E55.9 VITAMIN D DEFICIENCY: ICD-10-CM

## 2025-06-27 PROBLEM — R93.89 ABNORMAL ULTRASOUND: Status: RESOLVED | Noted: 2023-11-02 | Resolved: 2025-06-27

## 2025-06-27 PROBLEM — R53.83 FATIGUE: Status: RESOLVED | Noted: 2023-11-02 | Resolved: 2025-06-27

## 2025-06-27 PROBLEM — N18.9 CHRONIC KIDNEY DISEASE: Status: RESOLVED | Noted: 2022-04-08 | Resolved: 2025-06-27

## 2025-06-27 PROBLEM — R35.0 URINARY FREQUENCY: Status: RESOLVED | Noted: 2023-11-02 | Resolved: 2025-06-27

## 2025-06-27 PROCEDURE — 1159F MED LIST DOCD IN RCRD: CPT | Performed by: INTERNAL MEDICINE

## 2025-06-27 PROCEDURE — 99397 PER PM REEVAL EST PAT 65+ YR: CPT | Performed by: INTERNAL MEDICINE

## 2025-06-27 PROCEDURE — 1170F FXNL STATUS ASSESSED: CPT | Performed by: INTERNAL MEDICINE

## 2025-06-27 PROCEDURE — 3075F SYST BP GE 130 - 139MM HG: CPT | Performed by: INTERNAL MEDICINE

## 2025-06-27 PROCEDURE — 1160F RVW MEDS BY RX/DR IN RCRD: CPT | Performed by: INTERNAL MEDICINE

## 2025-06-27 PROCEDURE — 3078F DIAST BP <80 MM HG: CPT | Performed by: INTERNAL MEDICINE

## 2025-06-27 PROCEDURE — 1036F TOBACCO NON-USER: CPT | Performed by: INTERNAL MEDICINE

## 2025-06-27 PROCEDURE — 1158F ADVNC CARE PLAN TLK DOCD: CPT | Performed by: INTERNAL MEDICINE

## 2025-06-27 PROCEDURE — G0439 PPPS, SUBSEQ VISIT: HCPCS | Performed by: INTERNAL MEDICINE

## 2025-06-27 PROCEDURE — 1126F AMNT PAIN NOTED NONE PRSNT: CPT | Performed by: INTERNAL MEDICINE

## 2025-06-27 RX ORDER — ATENOLOL 25 MG/1
25 TABLET ORAL 2 TIMES DAILY
Qty: 180 TABLET | Refills: 3 | Status: SHIPPED | OUTPATIENT
Start: 2025-06-27

## 2025-06-27 RX ORDER — AMLODIPINE BESYLATE 5 MG/1
TABLET ORAL
Qty: 90 TABLET | Refills: 3 | Status: SHIPPED | OUTPATIENT
Start: 2025-06-27

## 2025-06-27 RX ORDER — LISINOPRIL 40 MG/1
40 TABLET ORAL DAILY
Qty: 90 TABLET | Refills: 3 | Status: SHIPPED | OUTPATIENT
Start: 2025-06-27

## 2025-06-27 ASSESSMENT — PAIN SCALES - GENERAL: PAINLEVEL_OUTOF10: 0-NO PAIN

## 2025-06-27 ASSESSMENT — ACTIVITIES OF DAILY LIVING (ADL)
DRESSING: INDEPENDENT
MANAGING_FINANCES: TOTAL CARE
BATHING: INDEPENDENT
GROCERY_SHOPPING: TOTAL CARE
TAKING_MEDICATION: TOTAL CARE
DOING_HOUSEWORK: TOTAL CARE

## 2025-06-27 ASSESSMENT — LIFESTYLE VARIABLES
HOW OFTEN DO YOU HAVE A DRINK CONTAINING ALCOHOL: NEVER
AUDIT-C TOTAL SCORE: 0
SKIP TO QUESTIONS 9-10: 1
HOW MANY STANDARD DRINKS CONTAINING ALCOHOL DO YOU HAVE ON A TYPICAL DAY: PATIENT DOES NOT DRINK
HOW OFTEN DO YOU HAVE SIX OR MORE DRINKS ON ONE OCCASION: NEVER

## 2025-06-27 NOTE — PROGRESS NOTES
"Chief Complaint   Patient presents with    Medicare Annual Wellness Visit Subsequent     Pt here for AWV       84 y.o. for AWV  Last visit 01/2025 daughter Mila present.   Dr Tillman 04/2025  ER 04/2025 fall.  Her  was falling and she was trying to prevent him from falling and they ended up both falling.  She did hurt her leg.  They think it was the left leg but they are not 100% sure.  Daughter indicates that patient had trouble weightbearing for a few days after that.  Patient did not wear her hearing aids today so hearing was limited.  Home bp readings 110/50-60s.     Past Medical History  HTN  hyperlipidemia  Hypothyroidism tsh  01/2025  normal  colon polyp scope 2020  Osteopenia  dexa 4/2024 T -2.9  myalgia secondary to statin  cataract ou sx  B12 deficiency  nightmares Dr Quintanilla, Dr Gore [facility]- ordered sleep study  VANGIE-could not tolerate treatment  CKD Dr Tillman.   abnormal CT abd per GI  pelvis US 12/2020 somewhat complex lower uterine segment appearing cystic abnormality. evaluated by urologist.   cystoscopy was negative. Dr Jewell  Dementia  fall, change in mental status, admission 12/2022 (orthostatic, abnormal ua) Dr Mendoza   anemia     , Nonsmoker       Blood pressure 138/62, pulse 69, temperature 37.4 °C (99.3 °F), height (!) 1.384 m (4' 6.5\"), weight (!) 44.4 kg (97 lb 12.8 oz), SpO2 97%.  Body mass index is 23.15 kg/m².    Physical Examination  General: NAD. Alert.   HEENT: Normocephalic atraumatic.    Eyes: no scleral icterus. Extraocular movements intact.  Pupils equal round and reactive to light.  Ears: Bilateral dry cerumen.  Could not visualize TM.  No erythema in canal.  Hearing decreased  Throat: No exudate  Neck:  Supple. No thyroid goiter.  Lymph nodes: No cervical or clavicular adenopathy  Cardiovascular: Regular rate rhythm S1-S2 normal no murmur. No carotid bruit.   Lungs: Clear to Auscultation without wheezing, rales, or rhonchi, Breath sounds symmetric. No use of " "accessory muscles  Abdomen:  Normoactive bowel sounds, soft, non-tender. No mass.  No organomegaly  Extremities: No pretibial edema  Neuro: no facial asymmetry. Strength upper and lower extremities 5/5. Sensation intact to light touch. No tremor. Babinski negative  Skin: no rash noted  Vascular: Difficult to palpate dorsalis pedis pulses.  Capillary refill 1 to 2 seconds.  No cyanosis.  Declined chaperone.  Breast symmetric: No rash.  No mass discharge or axillary adenopathy    Labs June 2025 CBC, CMP, lipid reviewed CKD stable slightly improved from 2024 readings glucose slightly high.  Discussed with patient and daughter.  Anemia stable over the last year    No results found for: \"HGBA1C\"  Lab Results   Component Value Date    GLUCOSE 100 (H) 06/05/2025    CALCIUM 9.3 06/05/2025     06/05/2025    K 4.1 06/05/2025    CO2 29 06/05/2025     06/05/2025    BUN 24 06/05/2025    CREATININE 1.59 (H) 06/05/2025     Lab Results   Component Value Date    ALT 9 06/05/2025    AST 15 06/05/2025    ALKPHOS 37 06/05/2025    BILITOT 0.5 06/05/2025     Lab Results   Component Value Date    WBC 4.7 06/05/2025    HGB 10.7 (L) 06/05/2025    HCT 33.3 (L) 06/05/2025    MCV 99.7 06/05/2025     06/05/2025     Lab Results   Component Value Date    CHOL 154 06/05/2025    CHOL 164 10/02/2024    CHOL 164 04/01/2024     Lab Results   Component Value Date    HDL 51 06/05/2025    HDL 44.9 10/02/2024    HDL 50.5 04/01/2024     Lab Results   Component Value Date    LDLCALC 87 06/05/2025    LDLCALC 96 10/02/2024    LDLCALC 96 04/01/2024     Lab Results   Component Value Date    TRIG 74 06/05/2025    TRIG 114 10/02/2024    TRIG 90 04/01/2024     No components found for: \"CHOLHDL\"      ASSESSMENT/PLAN  AWV  Living Will: Has a living will.   Cognition/Memory if 65 or above: Chronic problems with memory  Hepatitis C screen (18-79) not applicable  HIV screen(18-64, once) not applicable  Mammogram: Declines  Pap: Not applicable as " patient over 65  Dexa up-to-date April 2024 T-score -2.9  Colon cancer screening 45 and older: Colonoscopy done August 2020  Immunization: Reviewed she did have shingles vaccine but did not have the second dose of the most current shingles vaccine.  She declines further vaccination      1. Hypothyroidism, unspecified type (Primary)  - TSH with reflex to Free T4 if abnormal; Future    2. Vitamin D deficiency  - Vitamin D 25-Hydroxy,Total (for eval of Vitamin D levels); Future    3. Anemia, unspecified type  Stable. Hx of CKD.   - CBC; Future    4. Stage 3b chronic kidney disease (Multi)  Stable. Sees Dr Tillman.   - Comprehensive Metabolic Panel; Future  - CBC; Future    5. Hypertension, unspecified type  Home readings within goal.   - amLODIPine (Norvasc) 5 mg tablet; One po qhs  Dispense: 90 tablet; Refill: 3  - atenolol (Tenormin) 25 mg tablet; Take 1 tablet (25 mg) by mouth 2 times a day.  Dispense: 180 tablet; Refill: 3  - lisinopril 40 mg tablet; Take 1 tablet (40 mg) by mouth once daily.  Dispense: 90 tablet; Refill: 3    6. Hearing loss, unspecified hearing loss type, unspecified laterality  Usually uses hearing aides.         Medications Ordered Prior to Encounter[1]         [1]   Current Outpatient Medications on File Prior to Visit   Medication Sig Dispense Refill    amLODIPine (Norvasc) 5 mg tablet One po qhs 90 tablet 3    ammonium lactate (Lac-Hydrin) 12 % lotion Apply topically daily. apply to affected area      atenolol (Tenormin) 25 mg tablet Take 1 tablet (25 mg) by mouth 2 times a day. 180 tablet 3    atorvastatin (Lipitor) 20 mg tablet Take 1 tablet (20 mg) by mouth once daily. 90 tablet 3    calcitriol (Rocaltrol) 0.25 mcg capsule Take 1 capsule (0.25 mcg) by mouth. every Monday,Wednesday,Friday.      cholecalciferol (Vitamin D-3) 25 MCG (1000 UT) capsule Take 1 capsule (1,000 Units) by mouth once daily.      cranberry 500 mg capsule Take 1 capsule by mouth once daily.      cyanocobalamin, vitamin  B-12, 1,000 mcg tablet, sublingual Place 1 tablet (1,000 mcg) under the tongue once daily. As directed.      dorzolamide (Trusopt) 2 % ophthalmic solution Administer 1 drop into both eyes 2 times a day.      fluocinonide (Lidex) 0.05 % external solution Apply topically 2 times a day as needed for rash. 60 mL 11    L.acidoph,saliva/B.bif/S.therm (ACIDOPHILUS PROBIOTIC BLEND ORAL) Take 1 capsule by mouth once daily.      latanoprost (Xalatan) 0.005 % ophthalmic solution Administer 1 drop into both eyes once daily at bedtime.      levothyroxine (Synthroid, Levoxyl) 50 mcg tablet Take 1 tablet (50 mcg) by mouth once daily. 90 tablet 3    lisinopril 40 mg tablet TAKE ONE TABLET BY MOUTH EVERY DAY 90 tablet 3    triamcinolone (Kenalog) 0.1 % cream if needed.       No current facility-administered medications on file prior to visit.

## 2025-08-26 DIAGNOSIS — L30.9 ECZEMA, UNSPECIFIED TYPE: ICD-10-CM

## 2025-08-26 RX ORDER — FLUOCINONIDE TOPICAL SOLUTION USP, 0.05% 0.5 MG/ML
SOLUTION TOPICAL
Qty: 60 ML | Refills: 1 | Status: SHIPPED | OUTPATIENT
Start: 2025-08-26

## 2025-12-30 ENCOUNTER — APPOINTMENT (OUTPATIENT)
Dept: PRIMARY CARE | Facility: CLINIC | Age: 85
End: 2025-12-30
Payer: MEDICARE